# Patient Record
Sex: MALE | Race: BLACK OR AFRICAN AMERICAN | Employment: UNEMPLOYED | URBAN - METROPOLITAN AREA
[De-identification: names, ages, dates, MRNs, and addresses within clinical notes are randomized per-mention and may not be internally consistent; named-entity substitution may affect disease eponyms.]

---

## 2018-08-09 ENCOUNTER — TELEPHONE (OUTPATIENT)
Dept: ENT CLINIC | Age: 43
End: 2018-08-09

## 2019-02-19 ENCOUNTER — TELEPHONE (OUTPATIENT)
Dept: NON INVASIVE DIAGNOSTICS | Age: 44
End: 2019-02-19

## 2019-04-05 ENCOUNTER — HOSPITAL ENCOUNTER (EMERGENCY)
Age: 44
Discharge: HOME | End: 2019-04-05
Payer: COMMERCIAL

## 2019-04-05 VITALS
WEIGHT: 298 LBS | RESPIRATION RATE: 16 BRPM | HEART RATE: 82 BPM | BODY MASS INDEX: 36.29 KG/M2 | SYSTOLIC BLOOD PRESSURE: 111 MMHG | OXYGEN SATURATION: 97 % | DIASTOLIC BLOOD PRESSURE: 75 MMHG | TEMPERATURE: 98.4 F | HEIGHT: 76 IN

## 2019-04-05 DIAGNOSIS — B35.3 TINEA PEDIS OF LEFT FOOT: Primary | ICD-10-CM

## 2019-04-05 PROCEDURE — 99282 EMERGENCY DEPT VISIT SF MDM: CPT

## 2019-04-05 RX ORDER — LOSARTAN POTASSIUM 25 MG/1
50 TABLET ORAL DAILY
Status: ON HOLD | COMMUNITY
End: 2021-11-17 | Stop reason: HOSPADM

## 2019-04-05 NOTE — ED PROVIDER NOTES
[facility]      HPI:  4/5/19,   Time: 2:06 AM         Lige Closs is a 38 y.o. male presenting to the ED for waking him out of his sleep just prior to arrival was itching and between his left 4th and 5th toes he has never had before. He has no pain over his feet. Tried nothing to improve his condition. He is not diabetic. ROS:   Pertinent positives and negatives are stated within HPI, all other systems reviewed and are negative.  --------------------------------------------- PAST HISTORY ---------------------------------------------  Past Medical History:  has a past medical history of Hyperlipidemia, Hypertension, and Stroke ([facility] [address] Past Surgical History:  has no past surgical history on file. Social History:  reports that he has never smoked. He has never used smokeless tobacco. He reports that he drinks alcohol. He reports that he does not use drugs. Family History: family history includes Cancer in his father. The patients home medications have been reviewed. Allergies: Patient has no known allergies. -------------------------------------------------- RESULTS -------------------------------------------------  All laboratory and radiology results have been personally reviewed by myself   LABS:  No results found for this visit on 04/05/19. RADIOLOGY:  Interpreted by Radiologist.  No orders to display       ------------------------- NURSING NOTES AND VITALS REVIEWED ---------------------------   The nursing notes within the ED encounter and vital signs as below have been reviewed.    /75   Pulse 82   Temp 98.4 °F (36.9 °C) (Oral)   Resp 16   Ht 6' 4\" (1.93 m)   Wt 298 lb (135.2 kg)   SpO2 97%   BMI 36.27 kg/m²   Oxygen Saturation Interpretation: Normal      ---------------------------------------------------PHYSICAL EXAM--------------------------------------      Constitutional/General: Alert and oriented x3, well appearing, non toxic in NAD  Head: NC/AT  Eye: Bilateral sclera clear Neck: Supple  Pulmonary: Lungs clear to auscultation bilaterally, no wheezes, rales, or rhonchi. Not in respiratory distress  Cardiovascular:  Regular rate and rhythm, no murmurs, gallops, or rubs. 2+ distal pulses  Extremities: Moves all extremities x 4. Warm and well perfused. No lesions sugar affected area. 4th and 5th toes of the left foot on there is slight increase in erythema compared to the left. Otherwise left toes foot ankle: with FROM active and passive w/o crepitus, pulses +2, reflexes +2, temperature, strength, color, cap refill <2 secs, normal and all equal to the right  Skin: warm and dry without rash  Neurologic: GCS 15, Face is symmetric (VII), EOMs are intact (III, IV, VI), pupils are equal and reactive (II, III), tongue is midline (XII). The patient is walking in a smooth balanced and coordinated fashion w/o bumping or walking into anything (vision), talking w/ appropriate content and fluency, is fully attentive, and provided a spontaneous coherent history. Psych: Normal Affect      ------------------------------------------PROGRESS NOTES -------------------------------------------    MDM  Treating for tinea pedis. PCP and podiatry follow-up    ED COURSE MEDICATIONS:              Medications - No data to display        COUNSELING:   I have spoken with the patient and discussed todays results, in addition to providing specific details for the plan of care and counseling regarding the diagnosis and prognosis.     --------------------------------------- IMPRESSION & DISPOSITION --------------------------------     IMPRESSION(s):  1. Tinea pedis of left foot          DISPOSITION:  Disposition: Discharge to home. Patient condition is good.                  Fam Kramer, EVON - CNP  04/05/19 1810

## 2019-04-05 NOTE — ED NOTES
Reviewed discharge instructions with patient, discussed medications and addressed all patient questions/concerns. Pt verbalizes understanding.        Shilpa Kern RN  04/05/19 2145

## 2020-02-06 ENCOUNTER — APPOINTMENT (OUTPATIENT)
Dept: CT IMAGING | Age: 45
End: 2020-02-06
Payer: COMMERCIAL

## 2020-02-06 ENCOUNTER — APPOINTMENT (OUTPATIENT)
Dept: GENERAL RADIOLOGY | Age: 45
End: 2020-02-06
Payer: COMMERCIAL

## 2020-02-06 ENCOUNTER — HOSPITAL ENCOUNTER (EMERGENCY)
Age: 45
Discharge: HOME | End: 2020-02-07
Attending: EMERGENCY MEDICINE
Payer: COMMERCIAL

## 2020-02-06 LAB
ALBUMIN SERPL-MCNC: 4.4 G/DL (ref 3.5–5.2)
ALP BLD-CCNC: 56 U/L (ref 40–129)
ALT SERPL-CCNC: 25 U/L (ref 0–40)
ANION GAP SERPL CALCULATED.3IONS-SCNC: 12 MMOL/L (ref 7–16)
AST SERPL-CCNC: 26 U/L (ref 0–39)
BASOPHILS ABSOLUTE: 0.03 E9/L (ref 0–0.2)
BASOPHILS RELATIVE PERCENT: 0.5 % (ref 0–2)
BILIRUB SERPL-MCNC: 0.3 MG/DL (ref 0–1.2)
BUN BLDV-MCNC: 15 MG/DL (ref 6–20)
CALCIUM SERPL-MCNC: 9.1 MG/DL (ref 8.6–10.2)
CHLORIDE BLD-SCNC: 101 MMOL/L (ref 98–107)
CO2: 27 MMOL/L (ref 22–29)
CREAT SERPL-MCNC: 1.4 MG/DL (ref 0.7–1.2)
EOSINOPHILS ABSOLUTE: 0.12 E9/L (ref 0.05–0.5)
EOSINOPHILS RELATIVE PERCENT: 1.9 % (ref 0–6)
GFR AFRICAN AMERICAN: >60
GFR NON-AFRICAN AMERICAN: >60 ML/MIN/1.73
GLUCOSE BLD-MCNC: 107 MG/DL (ref 74–99)
HCT VFR BLD CALC: 47.1 % (ref 37–54)
HEMOGLOBIN: 16.4 G/DL (ref 12.5–16.5)
IMMATURE GRANULOCYTES #: 0.01 E9/L
IMMATURE GRANULOCYTES %: 0.2 % (ref 0–5)
LIPASE: 91 U/L (ref 13–60)
LYMPHOCYTES ABSOLUTE: 3.05 E9/L (ref 1.5–4)
LYMPHOCYTES RELATIVE PERCENT: 49.2 % (ref 20–42)
MCH RBC QN AUTO: 30 PG (ref 26–35)
MCHC RBC AUTO-ENTMCNC: 34.8 % (ref 32–34.5)
MCV RBC AUTO: 86.1 FL (ref 80–99.9)
MONOCYTES ABSOLUTE: 0.57 E9/L (ref 0.1–0.95)
MONOCYTES RELATIVE PERCENT: 9.2 % (ref 2–12)
NEUTROPHILS ABSOLUTE: 2.42 E9/L (ref 1.8–7.3)
NEUTROPHILS RELATIVE PERCENT: 39 % (ref 43–80)
PDW BLD-RTO: 12.8 FL (ref 11.5–15)
PLATELET # BLD: 252 E9/L (ref 130–450)
PMV BLD AUTO: 9.1 FL (ref 7–12)
POTASSIUM REFLEX MAGNESIUM: 4.1 MMOL/L (ref 3.5–5)
RBC # BLD: 5.47 E12/L (ref 3.8–5.8)
SODIUM BLD-SCNC: 140 MMOL/L (ref 132–146)
TOTAL PROTEIN: 7 G/DL (ref 6.4–8.3)
TROPONIN: <0.01 NG/ML (ref 0–0.03)
WBC # BLD: 6.2 E9/L (ref 4.5–11.5)

## 2020-02-06 PROCEDURE — 83690 ASSAY OF LIPASE: CPT

## 2020-02-06 PROCEDURE — 96375 TX/PRO/DX INJ NEW DRUG ADDON: CPT

## 2020-02-06 PROCEDURE — 80053 COMPREHEN METABOLIC PANEL: CPT

## 2020-02-06 PROCEDURE — 71045 X-RAY EXAM CHEST 1 VIEW: CPT

## 2020-02-06 PROCEDURE — 36415 COLL VENOUS BLD VENIPUNCTURE: CPT

## 2020-02-06 PROCEDURE — 6360000002 HC RX W HCPCS: Performed by: EMERGENCY MEDICINE

## 2020-02-06 PROCEDURE — 93005 ELECTROCARDIOGRAM TRACING: CPT | Performed by: EMERGENCY MEDICINE

## 2020-02-06 PROCEDURE — 96374 THER/PROPH/DIAG INJ IV PUSH: CPT

## 2020-02-06 PROCEDURE — 99284 EMERGENCY DEPT VISIT MOD MDM: CPT

## 2020-02-06 PROCEDURE — 85025 COMPLETE CBC W/AUTO DIFF WBC: CPT

## 2020-02-06 PROCEDURE — 84484 ASSAY OF TROPONIN QUANT: CPT

## 2020-02-06 PROCEDURE — 70450 CT HEAD/BRAIN W/O DYE: CPT

## 2020-02-06 RX ORDER — METOCLOPRAMIDE HYDROCHLORIDE 5 MG/ML
10 INJECTION INTRAMUSCULAR; INTRAVENOUS ONCE
Status: COMPLETED | OUTPATIENT
Start: 2020-02-06 | End: 2020-02-06

## 2020-02-06 RX ORDER — DIPHENHYDRAMINE HYDROCHLORIDE 50 MG/ML
12.5 INJECTION INTRAMUSCULAR; INTRAVENOUS ONCE
Status: COMPLETED | OUTPATIENT
Start: 2020-02-06 | End: 2020-02-06

## 2020-02-06 RX ADMIN — DIPHENHYDRAMINE HYDROCHLORIDE 12.5 MG: 50 INJECTION, SOLUTION INTRAMUSCULAR; INTRAVENOUS at 22:59

## 2020-02-06 RX ADMIN — METOCLOPRAMIDE 10 MG: 5 INJECTION, SOLUTION INTRAMUSCULAR; INTRAVENOUS at 22:59

## 2020-02-06 ASSESSMENT — VISUAL ACUITY
OU: 20/15
OD: 20/30
OS: 20/30

## 2020-02-06 ASSESSMENT — PAIN SCALES - GENERAL: PAINLEVEL_OUTOF10: 10

## 2020-02-07 VITALS
BODY MASS INDEX: 35.92 KG/M2 | DIASTOLIC BLOOD PRESSURE: 82 MMHG | HEIGHT: 76 IN | HEART RATE: 82 BPM | SYSTOLIC BLOOD PRESSURE: 149 MMHG | RESPIRATION RATE: 14 BRPM | WEIGHT: 295 LBS | TEMPERATURE: 97.7 F | OXYGEN SATURATION: 97 %

## 2020-02-07 NOTE — ED NOTES
Bed: 12  Expected date:   Expected time:   Means of arrival:   Comments:  Triage 43 Alina Tierney RN  02/06/20 [id number]

## 2020-02-07 NOTE — ED PROVIDER NOTES
Department of Emergency Medicine   ED  Provider Note  Admit Date/RoomTime: 2/6/2020  9:11 PM  ED Room: NIKI/NIKI      History of Present Illness:  2/6/20, Time: 9:35 PM         Susan Spivey is a 54 y.o. male presenting to the ED for headache and numbness, beginning 7:30 this evening. The complaint has been persistent, moderate in severity, and worsened by nothing. Pt reports that he felt a HA developing around 1 PM this afternoon. His HA slowly progressed throughout the day and was full blown by 7:30. Around 8:15, pt began to have two episodes left arm and hand numbness lasting 15 seconds each. Pt also having dizziness and blurred vision. He notes that his arm numbness has since resolved. Hx of TIA in 2015. Pt denies LOC, SOB, CP, back pain, neck pain, n/v/d, fever, chills, dizziness, coughing, abdominal pain, leg numbness or any other general complaints. Review of Systems:   Pertinent positives and negatives are stated within HPI, all other systems reviewed and are negative.    --------------------------------------------- PAST HISTORY ---------------------------------------------  Past Medical History:  has a past medical history of Hyperlipidemia, Hypertension, and Stroke ([facility] [address] Past Surgical History:  has no past surgical history on file. Social History:  reports that he has never smoked. He has never used smokeless tobacco. He reports current alcohol use. He reports that he does not use drugs. Family History: family history includes Cancer in his father. The patients home medications have been reviewed. Allergies: Patient has no known allergies.     ---------------------------------------------------PHYSICAL EXAM--------------------------------------    Constitutional/General: Alert and oriented x3, well appearing, non toxic in NAD  Head: Normocephalic and atraumatic  Eyes: PERRL, EOMI, conjunctiva normal, sclera non icteric  Respiratory: Lungs clear to auscultation bilaterally, no wheezes, rales, or rhonchi. Not in respiratory distress  Cardiovascular:  Regular rate. Regular rhythm. No murmurs, gallops, or rubs. 2+ distal pulses  Chest: No chest wall tenderness  GI:  Abdomen Soft, Non tender, Non distended. Musculoskeletal: Moves all extremities x 4. Warm and well perfused, no clubbing, cyanosis, or edema. Integument: Skin warm and dry. Physiatric: Normal Affect  Neurologic: GCS 15, no focal deficits, symmetric strength 5/5 in the upper and lower extremities bilaterally, NIH:0, see scale  Last known well time: 7:30 PM  NIH Stroke Scale at time of initial evaluation: 9:30  1A: Level of Consciousness 0 - alert; keenly responsive   1B: Ask Month and Age 0 - answers both questions correctly   1C: Tell Patient To Open and Close Eyes, then Hand  Squeeze 0 - performs both tasks correctly   2: Test Horizontal Extraocular Movements 0 - normal   3: Test Visual Fields 0 - no visual loss   4: Test Facial Palsy 0 - normal symmetric movement   5A: Test Left Arm Motor Drift 0 - no drift, limb holds 90 (or 45) degrees for full 10 seconds   5B: Test Right Arm Motor Drift 0 - no drift, limb holds 90 (or 45) degrees for full 10 seconds   6A: Test Left Leg Motor Drift 0 - no drift; leg holds 30 degree position for full 5 seconds   6B: Test Right Leg Motor Drift 0 - no drift; leg holds 30 degree position for full 5 seconds   7: Test Limb Ataxia   (FNF/Heel-Shin) 0 - absent   8: Test Sensation 0 - normal; no sensory loss   9: Test Language/Aphasia 0 - no aphasia, normal   10: Test Dysarthria 0 - normal   11: Test Extinction/Inattention 0 - no abnormality   Total NIH Stroke Score: 0     -------------------------------------------------- RESULTS -------------------------------------------------  I have personally reviewed all laboratory and imaging results for this patient. Results are listed below.      LABS:  Results for orders placed or performed during the hospital encounter of 02/06/20   CBC Auto

## 2020-02-08 LAB
EKG ATRIAL RATE: 66 BPM
EKG P AXIS: 20 DEGREES
EKG P-R INTERVAL: 152 MS
EKG Q-T INTERVAL: 382 MS
EKG QRS DURATION: 92 MS
EKG QTC CALCULATION (BAZETT): 400 MS
EKG R AXIS: 1 DEGREES
EKG T AXIS: 4 DEGREES
EKG VENTRICULAR RATE: 66 BPM

## 2020-02-08 PROCEDURE — 93010 ELECTROCARDIOGRAM REPORT: CPT | Performed by: INTERNAL MEDICINE

## 2021-05-10 ENCOUNTER — TELEPHONE (OUTPATIENT)
Dept: NON INVASIVE DIAGNOSTICS | Age: 46
End: 2021-05-10

## 2021-05-12 NOTE — TELEPHONE ENCOUNTER
Lm to call the office back to schedule a new pt appointment. Records scanned   Seen Dr Fraga Pleasure 10/28/2016.

## 2021-05-18 NOTE — TELEPHONE ENCOUNTER
Pt returning call to schedule NP apt with EP. Scheduled with Dr. Magdi Kline for: 5/25/21 - Former [facility] pt Loop pt - all recs in chart. Pt wants Loop removed.

## 2021-05-27 NOTE — TELEPHONE ENCOUNTER
Patient scheduled 05/25/2021 @ 8:30 AM with Dr Jessenia Reddy. He no showed to the appointment. Called the patient and he said that he forgot all about the appointment. Asked to R/S the appt and the patient said that he will call back to schedule appointment when ready.

## 2021-07-13 PROCEDURE — 99283 EMERGENCY DEPT VISIT LOW MDM: CPT

## 2021-07-13 ASSESSMENT — PAIN SCALES - GENERAL: PAINLEVEL_OUTOF10: 7

## 2021-07-14 ENCOUNTER — HOSPITAL ENCOUNTER (EMERGENCY)
Age: 46
Discharge: HOME | End: 2021-07-14
Payer: COMMERCIAL

## 2021-07-14 ENCOUNTER — APPOINTMENT (OUTPATIENT)
Dept: CT IMAGING | Age: 46
End: 2021-07-14
Payer: COMMERCIAL

## 2021-07-14 VITALS
DIASTOLIC BLOOD PRESSURE: 85 MMHG | BODY MASS INDEX: 35.07 KG/M2 | HEART RATE: 75 BPM | WEIGHT: 288 LBS | OXYGEN SATURATION: 97 % | TEMPERATURE: 98 F | SYSTOLIC BLOOD PRESSURE: 130 MMHG | HEIGHT: 76 IN | RESPIRATION RATE: 16 BRPM

## 2021-07-14 DIAGNOSIS — H53.9 VISUAL DISTURBANCE: Primary | ICD-10-CM

## 2021-07-14 LAB
ALBUMIN SERPL-MCNC: 4 G/DL (ref 3.5–5.2)
ALP BLD-CCNC: 59 U/L (ref 40–129)
ALT SERPL-CCNC: 21 U/L (ref 0–40)
ANION GAP SERPL CALCULATED.3IONS-SCNC: 6 MMOL/L (ref 7–16)
AST SERPL-CCNC: 26 U/L (ref 0–39)
BASOPHILS ABSOLUTE: 0.03 E9/L (ref 0–0.2)
BASOPHILS RELATIVE PERCENT: 0.4 % (ref 0–2)
BILIRUB SERPL-MCNC: 0.4 MG/DL (ref 0–1.2)
BUN BLDV-MCNC: 21 MG/DL (ref 6–20)
CALCIUM SERPL-MCNC: 9.4 MG/DL (ref 8.6–10.2)
CHLORIDE BLD-SCNC: 99 MMOL/L (ref 98–107)
CO2: 30 MMOL/L (ref 22–29)
CREAT SERPL-MCNC: 1.3 MG/DL (ref 0.7–1.2)
EOSINOPHILS ABSOLUTE: 0.19 E9/L (ref 0.05–0.5)
EOSINOPHILS RELATIVE PERCENT: 2.6 % (ref 0–6)
GFR AFRICAN AMERICAN: >60
GFR NON-AFRICAN AMERICAN: >60 ML/MIN/1.73
GLUCOSE BLD-MCNC: 114 MG/DL (ref 74–99)
HCT VFR BLD CALC: 46.5 % (ref 37–54)
HEMOGLOBIN: 15.7 G/DL (ref 12.5–16.5)
IMMATURE GRANULOCYTES #: 0.01 E9/L
IMMATURE GRANULOCYTES %: 0.1 % (ref 0–5)
LYMPHOCYTES ABSOLUTE: 3.05 E9/L (ref 1.5–4)
LYMPHOCYTES RELATIVE PERCENT: 42.1 % (ref 20–42)
MCH RBC QN AUTO: 28.8 PG (ref 26–35)
MCHC RBC AUTO-ENTMCNC: 33.8 % (ref 32–34.5)
MCV RBC AUTO: 85.2 FL (ref 80–99.9)
MONOCYTES ABSOLUTE: 0.78 E9/L (ref 0.1–0.95)
MONOCYTES RELATIVE PERCENT: 10.8 % (ref 2–12)
NEUTROPHILS ABSOLUTE: 3.19 E9/L (ref 1.8–7.3)
NEUTROPHILS RELATIVE PERCENT: 44 % (ref 43–80)
PDW BLD-RTO: 12.8 FL (ref 11.5–15)
PLATELET # BLD: 256 E9/L (ref 130–450)
PMV BLD AUTO: 8.8 FL (ref 7–12)
POTASSIUM SERPL-SCNC: 4.5 MMOL/L (ref 3.5–5)
RBC # BLD: 5.46 E12/L (ref 3.8–5.8)
SODIUM BLD-SCNC: 135 MMOL/L (ref 132–146)
TOTAL PROTEIN: 6.9 G/DL (ref 6.4–8.3)
WBC # BLD: 7.3 E9/L (ref 4.5–11.5)

## 2021-07-14 PROCEDURE — 80053 COMPREHEN METABOLIC PANEL: CPT

## 2021-07-14 PROCEDURE — 70450 CT HEAD/BRAIN W/O DYE: CPT

## 2021-07-14 PROCEDURE — 85025 COMPLETE CBC W/AUTO DIFF WBC: CPT

## 2021-07-14 NOTE — ED NOTES
FIRST PROVIDER CONTACT ASSESSMENT NOTE      Department of Emergency Medicine   7/13/21  10:54 PM EDT    Chief Complaint: Diplopia (Right Eye x2 Months; Intermittent; Scheduled for MRI) and Headache      History of Present Illness:   Aracely Caro is a 51 y.o. male who presents to the ED for    Medical History:  has a past medical history of Hyperlipidemia, Hypertension, and Stroke ([facility] [address] Surgical History:  has no past surgical history on file. Social History:  reports that he has never smoked. He has never used smokeless tobacco. He reports current alcohol use. He reports that he does not use drugs. Family History: family history includes Cancer in his father. *ALLERGIES*     Patient has no known allergies. Physical Exam:      VS:  There were no vitals taken for this visit.      Initial Plan of Care:  Initiate Treatment-Testing, Proceed toTreatment Area When Bed Available for ED Attending/MLP to Continue Care    -----------------END OF FIRST PROVIDER CONTACT ASSESSMENT NOTE--------------  Electronically signed by EVON Reyes CNP   DD: 7/13/21             EVON Faith CNP  07/13/21 [id number]

## 2021-07-14 NOTE — ED PROVIDER NOTES
HPI:  7/14/21, Time: 1:45 AM EDT         Asif Barba is a 51 y.o. male presenting to the ED for Intermittent diplopia, beginning 2 months ago. The complaint has been intermittent, moderate in severity, and worsened by nothing. Patient does have a history of a stroke 2 years ago. He states is on the posterior left aspect of his head causing headaches at the time. Has not had the symptoms until approximately 2 months ago. States these are intermittent and occur from time to time usually occur in the afternoon. . Patient denies fever/chills, sore throat, cough, congestion, chest pain, shortness of breath, edema, headache, visual disturbances, focal paresthesias, focal weakness, abdominal pain, nausea, vomiting, diarrhea, constipation, dysuria, hematuria, trauma, neck or back pain, rash or other complaints. ROS:   A complete review of systems was performed and all pertinent positives and negatives are stated within HPI, all other systems reviewed and are negative.      --------------------------------------------- PAST HISTORY ---------------------------------------------  Past Medical History:  has a past medical history of Hyperlipidemia, Hypertension, and Stroke ([facility] [address] Past Surgical History:  has no past surgical history on file. Social History:  reports that he has never smoked. He has never used smokeless tobacco. He reports current alcohol use. He reports that he does not use drugs. Family History: family history includes Cancer in his father. The patients home medications have been reviewed. Allergies: Patient has no known allergies. ----------------------------------------PHYSICAL EXAM--------------------------------------  Constitutional:  Well developed, well nourished, no acute distress, non-toxic appearance   Eyes:  PERRL, conjunctiva normal, EOMI  HENT:  Atraumatic, external ears normal, nose normal, oropharynx moist, no pharyngeal exudates.  Neck- normal range of motion, no nuchal rigidity   Respiratory:  No respiratory distress, normal breath sounds, no rales, no wheezing   Cardiovascular:  Normal rate, normal rhythm, no murmurs, no gallops, no rubs. Radial and DP pulses 2+ bilaterally. GI:  Soft, nondistended, normal bowel sounds, nontender, no organomegaly, no mass, no rebound, no guarding   :  No costovertebral angle tenderness   Musculoskeletal:  No edema, no tenderness, no deformities. Back- no tenderness  Integument:  Well hydrated, no rash. Adequate perfusion. Lymphatic:  No cervical lymphadenopathy noted   Neurologic:  Alert & oriented x 3, CN 2-12 normal, normal motor function, normal sensory function, no focal deficits noted. Normal gait. Psychiatric:  Speech and behavior appropriate       -------------------------------------------------- RESULTS -------------------------------------------------  I have personally reviewed all laboratory and imaging results for this patient. Results are listed below.      LABS:  Results for orders placed or performed during the hospital encounter of 07/14/21   CBC Auto Differential   Result Value Ref Range    WBC 7.3 4.5 - 11.5 E9/L    RBC 5.46 3.80 - 5.80 E12/L    Hemoglobin 15.7 12.5 - 16.5 g/dL    Hematocrit 46.5 37.0 - 54.0 %    MCV 85.2 80.0 - 99.9 fL    MCH 28.8 26.0 - 35.0 pg    MCHC 33.8 32.0 - 34.5 %    RDW 12.8 11.5 - 15.0 fL    Platelets 600 980 - 862 E9/L    MPV 8.8 7.0 - 12.0 fL    Neutrophils % 44.0 43.0 - 80.0 %    Immature Granulocytes % 0.1 0.0 - 5.0 %    Lymphocytes % 42.1 (H) 20.0 - 42.0 %    Monocytes % 10.8 2.0 - 12.0 %    Eosinophils % 2.6 0.0 - 6.0 %    Basophils % 0.4 0.0 - 2.0 %    Neutrophils Absolute 3.19 1.80 - 7.30 E9/L    Immature Granulocytes # 0.01 E9/L    Lymphocytes Absolute 3.05 1.50 - 4.00 E9/L    Monocytes Absolute 0.78 0.10 - 0.95 E9/L    Eosinophils Absolute 0.19 0.05 - 0.50 E9/L    Basophils Absolute 0.03 0.00 - 0.20 E9/L   Comprehensive Metabolic Panel   Result Value Ref Range Sodium 135 132 - 146 mmol/L    Potassium 4.5 3.5 - 5.0 mmol/L    Chloride 99 98 - 107 mmol/L    CO2 30 (H) 22 - 29 mmol/L    Anion Gap 6 (L) 7 - 16 mmol/L    Glucose 114 (H) 74 - 99 mg/dL    BUN 21 (H) 6 - 20 mg/dL    CREATININE 1.3 (H) 0.7 - 1.2 mg/dL    GFR Non-African American >60 >=60 mL/min/1.73    GFR African American >60     Calcium 9.4 8.6 - 10.2 mg/dL    Total Protein 6.9 6.4 - 8.3 g/dL    Albumin 4.0 3.5 - 5.2 g/dL    Total Bilirubin 0.4 0.0 - 1.2 mg/dL    Alkaline Phosphatase 59 40 - 129 U/L    ALT 21 0 - 40 U/L    AST 26 0 - 39 U/L       RADIOLOGY:  Interpreted by Radiologist.  CT HEAD WO CONTRAST   Final Result   No acute intracranial abnormality. Heterogeneously hypodense area in the left posterior parietal lobe as above. Although this likely represent an area of old infarct or remote hemorrhage,   the possibility of a very slow growing neoplasm cannot be entirely excluded. MRI with and without IV contrast may be obtained if clinically indicated. ------------------------- NURSING NOTES AND VITALS REVIEWED ---------------------------  The nursing notes within the ED encounter and vital signs as below have been reviewed by myself. /85   Pulse 75   Temp 98 °F (36.7 °C) (Oral)   Resp 16   Ht 6' 4\" (1.93 m)   Wt 288 lb (130.6 kg)   SpO2 97%   BMI 35.06 kg/m²   Oxygen Saturation Interpretation: Normal      The patients available past medical records and past encounters were reviewed. ------------------------------ ED COURSE/MEDICAL DECISION MAKING----------------------  Medications - No data to display           Medical Decision Making:    Patient 41-year-old male I discussed with him found that he had a stroke 2 years ago in the posterior left aspect of his brain. Patient had the new onset diplopia regarding the right vision field that could be related to this previous stroke.   CT imaging did reveal a previous infarction region did not reveal tonicity of the lesion. Patient after receiving CT imaging and laboratory evaluation that was reassuring was told that he needs to follow back up with his neurologist [facility] for further evaluation of his subacute short-term diplopia that is been ongoing for a few months. He had no diplopia in the emergency department no symptoms whatsoever was stable and will likely need for neurologic evaluation on outpatient basis to continue to determine the cause of this intermittent diplopia. Patient was explicitly instructed on specific signs and symptoms on which to return to the emergency room for. Patient was instructed to return to the ER for any new or worsening symptoms. Additional discharge instructions were given verbally. All questions were answered. Patient is comfortable and agreeable with discharge plan. Patient in no acute distress and non-toxic in appearance. This patient's ED course included: a personal history and physicial examination, re-evaluation prior to disposition, multiple bedside re-evaluations, IV medications, cardiac monitoring, continuous pulse oximetry, complex medical decision making and emergency management and a personal history and physicial eaxmination    This patient has remained hemodynamically stable during their ED course. Re-Evaluations:  Time: 2340   Re-evaluation. Patients symptoms show no change  Repeat physical examination is not changed        Counseling: The emergency provider has spoken with the patient and discussed todays results, in addition to providing specific details for the plan of care and counseling regarding the diagnosis and prognosis. Questions are answered at this time and they are agreeable with the plan.    --------------------------- IMPRESSION AND DISPOSITION ---------------------------------    IMPRESSION  1.  Visual disturbance        DISPOSITION  Disposition: Discharge to home  Patient condition is stable              Becky Kapoor DO  Resident  07/16/21 1503

## 2021-09-02 ENCOUNTER — HOSPITAL ENCOUNTER (EMERGENCY)
Age: 46
Discharge: HOME | End: 2021-09-02
Payer: COMMERCIAL

## 2021-09-02 VITALS
BODY MASS INDEX: 35.93 KG/M2 | TEMPERATURE: 97.5 F | SYSTOLIC BLOOD PRESSURE: 129 MMHG | HEART RATE: 94 BPM | WEIGHT: 289 LBS | HEIGHT: 75 IN | DIASTOLIC BLOOD PRESSURE: 79 MMHG | RESPIRATION RATE: 18 BRPM | OXYGEN SATURATION: 95 %

## 2021-09-02 DIAGNOSIS — B02.9 HERPES ZOSTER WITHOUT COMPLICATION: Primary | ICD-10-CM

## 2021-09-02 PROCEDURE — 6370000000 HC RX 637 (ALT 250 FOR IP): Performed by: NURSE PRACTITIONER

## 2021-09-02 PROCEDURE — 99282 EMERGENCY DEPT VISIT SF MDM: CPT

## 2021-09-02 RX ORDER — OXYCODONE AND ACETAMINOPHEN 10; 325 MG/1; MG/1
1 TABLET ORAL EVERY 6 HOURS PRN
Qty: 12 TABLET | Refills: 0 | Status: SHIPPED | OUTPATIENT
Start: 2021-09-02 | End: 2021-09-05

## 2021-09-02 RX ORDER — OXYCODONE HYDROCHLORIDE 5 MG/1
10 TABLET ORAL ONCE
Status: COMPLETED | OUTPATIENT
Start: 2021-09-02 | End: 2021-09-02

## 2021-09-02 RX ADMIN — OXYCODONE HYDROCHLORIDE 10 MG: 5 TABLET ORAL at 23:22

## 2021-09-02 ASSESSMENT — PAIN SCALES - GENERAL: PAINLEVEL_OUTOF10: 7

## 2021-09-03 NOTE — ED PROVIDER NOTES
[facility]  Department of Emergency Medicine   ED  Encounter Note  Admit Date/RoomTime: 2021 10:58 PM  ED Room: 36/36    NAME: John Maldonado  : 1975  MRN: [id number]     Chief Complaint:  Generalized Body Aches (seen at urgent care this am dx with shingles )    History of Present Illness       John Maldonado is a 51 y.o. old male who presents to the emergency department by private vehicle, for gradual onset of red, raised and painful area on coccyx which began 4 day(s) prior to arrival.  The symptoms were caused by unknown cause. He was evaluated at the urgent care and diagnosed with shingles. He was prescribed valacyclovir and has just started taking this medication without improvement. Since onset the symptoms have been persistent. Prior history of similar episodes: No.   His symptoms are associated with nothing additional and relieved by nothing. He denies any headache, neck pain, fever, chills, sore throat, chest pain, shortness of breath, cough, dental pain, nausea, vomiting, diarrhea, constipation, or dysuria. ROS   Pertinent positives and negatives are stated within HPI, all other systems reviewed and are negative. Past Medical History:  has a past medical history of Hyperlipidemia, Hypertension, and Stroke ([facility] [address] Surgical History:  has no past surgical history on file. Social History:  reports that he has never smoked. He has never used smokeless tobacco. He reports current alcohol use. He reports that he does not use drugs. Family History: family history includes Cancer in his father. Allergies: Patient has no known allergies. Physical Exam   Oxygen Saturation Interpretation: Normal.        ED Triage Vitals [21 2224]   BP Temp Temp Source Pulse Resp SpO2 Height Weight   129/79 97.5 °F (36.4 °C) Tympanic 94 18 95 % 6' 3\" (1.905 m) 289 lb (131.1 kg)         Constitutional:  Alert, development consistent with age.   HEENT: NC/NT. Airway patent. Eyes:  PERRL, EOMI, no discharge. Ears:  TMs without perforation, injection, or bulging. External canals clear without exudate. Mouth:  Mucous membranes moist without lesions, tongue and gums normal.  Throat:  Pharynx without injection, exudate, or tonsillar hypertrophy. Airway patient. Neck:  Supple. No lymphadenopathy. Respiratory:  Clear to auscultation and breath sounds equal.  CV:  Regular rate and rhythm. GI:  Abdomen Soft, nontender, +BS. Integument:  Skin turgor: Normal.              Mildly erythemic tender vesicular rash on his coccyx bilateral of the midline. No surrounding erythema, edema, or drainage. Neurological:  Orientation age-appropriate unless noted elseware. Motor functions intact. Lab / Imaging Results   (All laboratory and radiology results have been personally reviewed by myself)  Labs:  No results found for this visit on 09/02/21. Imaging: All Radiology results interpreted by Radiologist unless otherwise noted. No orders to display       ED Course / Medical Decision Making     Medications   oxyCODONE (ROXICODONE) immediate release tablet 10 mg (has no administration in time range)        Consults:   None    Procedures:   none    MDM:   Patient has the clinical presentation of herpes Boster on his coccyx region. He appears well and nontoxic. He has no systemic symptoms. He is already prescribed valacyclovir by the urgent care. He was medicated symptomatically with oxycodone in the emergency department and will be given a prescription for home. His symptoms have been going on for approximately 4 days and I do not think that oral steroids would benefit him at this time. He is appropriate for discharge and outpatient follow-up. He is instructed to return to emergency department immediately with any new or worsening symptoms.     Plan of Care/Counseling:  EVON Ocasio CNP reviewed today's visit with the patient and spouse / life partner in addition to providing specific details for the plan of care and counseling regarding the diagnosis and prognosis. Questions are answered at this time and are agreeable with the plan. Assessment      1. Herpes zoster without complication      Plan   Discharged home. Patient condition is good    New Medications     New Prescriptions    OXYCODONE-ACETAMINOPHEN (PERCOCET)  MG PER TABLET    Take 1 tablet by mouth every 6 hours as needed for Pain for up to 3 days. Intended supply: 30 days     Electronically signed by EVON Mathis CNP   DD: 9/2/21  **This report was transcribed using voice recognition software. Every effort was made to ensure accuracy; however, inadvertent computerized transcription errors may be present.   END OF ED PROVIDER NOTE       EVON Orourke CNP  09/02/21 [id number]

## 2021-09-08 ENCOUNTER — HOSPITAL ENCOUNTER (OUTPATIENT)
Dept: ULTRASOUND IMAGING | Age: 46
Discharge: HOME | End: 2021-09-10
Payer: COMMERCIAL

## 2021-09-08 ENCOUNTER — HOSPITAL ENCOUNTER (EMERGENCY)
Age: 46
Discharge: HOME | End: 2021-09-09
Attending: EMERGENCY MEDICINE
Payer: COMMERCIAL

## 2021-09-08 DIAGNOSIS — R22.43 LOCALIZED SWELLING, MASS AND LUMP, LOWER LIMB, BILATERAL: ICD-10-CM

## 2021-09-08 DIAGNOSIS — I82.4Y9 ACUTE DEEP VEIN THROMBOSIS (DVT) OF PROXIMAL VEIN OF LOWER EXTREMITY, UNSPECIFIED LATERALITY (HCC): Primary | ICD-10-CM

## 2021-09-08 LAB
ALBUMIN SERPL-MCNC: 3.8 G/DL (ref 3.5–5.2)
ALP BLD-CCNC: 74 U/L (ref 40–129)
ALT SERPL-CCNC: 44 U/L (ref 0–40)
ANION GAP SERPL CALCULATED.3IONS-SCNC: 7 MMOL/L (ref 7–16)
APTT: 26.6 SEC (ref 24.5–35.1)
AST SERPL-CCNC: 24 U/L (ref 0–39)
BILIRUB SERPL-MCNC: 0.5 MG/DL (ref 0–1.2)
BUN BLDV-MCNC: 11 MG/DL (ref 6–20)
CALCIUM SERPL-MCNC: 9.1 MG/DL (ref 8.6–10.2)
CHLORIDE BLD-SCNC: 98 MMOL/L (ref 98–107)
CO2: 30 MMOL/L (ref 22–29)
CREAT SERPL-MCNC: 1.3 MG/DL (ref 0.7–1.2)
GFR AFRICAN AMERICAN: >60
GFR NON-AFRICAN AMERICAN: >60 ML/MIN/1.73
GLUCOSE BLD-MCNC: 344 MG/DL (ref 74–99)
HCT VFR BLD CALC: 43.2 % (ref 37–54)
HEMOGLOBIN: 14.5 G/DL (ref 12.5–16.5)
INR BLD: 1.1
MCH RBC QN AUTO: 28.7 PG (ref 26–35)
MCHC RBC AUTO-ENTMCNC: 33.6 % (ref 32–34.5)
MCV RBC AUTO: 85.5 FL (ref 80–99.9)
PDW BLD-RTO: 13.2 FL (ref 11.5–15)
PLATELET # BLD: 151 E9/L (ref 130–450)
PMV BLD AUTO: 9.3 FL (ref 7–12)
POTASSIUM SERPL-SCNC: 4.5 MMOL/L (ref 3.5–5)
PROTHROMBIN TIME: 11.7 SEC (ref 9.3–12.4)
RBC # BLD: 5.05 E12/L (ref 3.8–5.8)
SODIUM BLD-SCNC: 135 MMOL/L (ref 132–146)
TOTAL PROTEIN: 7.3 G/DL (ref 6.4–8.3)
WBC # BLD: 7.1 E9/L (ref 4.5–11.5)

## 2021-09-08 PROCEDURE — 93971 EXTREMITY STUDY: CPT

## 2021-09-08 PROCEDURE — 85610 PROTHROMBIN TIME: CPT

## 2021-09-08 PROCEDURE — 80053 COMPREHEN METABOLIC PANEL: CPT

## 2021-09-08 PROCEDURE — 85730 THROMBOPLASTIN TIME PARTIAL: CPT

## 2021-09-08 PROCEDURE — 96372 THER/PROPH/DIAG INJ SC/IM: CPT

## 2021-09-08 PROCEDURE — 85027 COMPLETE CBC AUTOMATED: CPT

## 2021-09-08 PROCEDURE — 99283 EMERGENCY DEPT VISIT LOW MDM: CPT

## 2021-09-08 PROCEDURE — 93971 EXTREMITY STUDY: CPT | Performed by: RADIOLOGY

## 2021-09-08 RX ORDER — TOPIRAMATE 25 MG/1
25 TABLET ORAL 2 TIMES DAILY
COMMUNITY
End: 2021-11-05

## 2021-09-08 RX ORDER — LEVETIRACETAM 500 MG/1
1000 TABLET ORAL 2 TIMES DAILY
COMMUNITY
End: 2022-06-08

## 2021-09-08 RX ORDER — ROSUVASTATIN CALCIUM 5 MG/1
5 TABLET, COATED ORAL EVERY OTHER DAY
COMMUNITY

## 2021-09-08 NOTE — ED TRIAGE NOTES
FIRST PROVIDER CONTACT ASSESSMENT NOTE                                                                                                Department of Emergency Medicine                                                      First Provider Note  21  4:30 PM EDT  NAME: Farzaneh Warner  : 1975  MRN: [id number]    Chief Complaint: Leg Pain (had US of left calf today and was sent to ED by doctor)      History of Present Illness:   Farzaneh Warner is a 51 y.o. male who presents to the ED for sent by PCP for +dvt    Focused Physical Exam:  VS:    ED Triage Vitals   BP Temp Temp Source Pulse Resp SpO2 Height Weight   21 1451 21 1451 21 1451 21 1447 21 1451 21 1447 21 1451 21 1451   (!) 151/95 98.7 °F (37.1 °C) Oral 89 16 95 % 6' 2\" (1.88 m) 292 lb (132.5 kg)        General: Alert and in no apparent distress. Medical History:  has a past medical history of Hyperlipidemia, Hypertension, and Stroke ([facility] [place] [address] Surgical History:  has no past surgical history on file. Social History:  reports that he has never smoked. He has never used smokeless tobacco. He reports current alcohol use. He reports that he does not use drugs. Family History: family history includes Cancer in his father. Allergies: Patient has no known allergies.      Initial Plan of Care:  Initiate Treatment-Testing, Proceed toTreatment Area When Bed Available for ED Attending/MLP to Continue Care    -------------------------------------------------END OF FIRST PROVIDER CONTACT ASSESSMENT NOTE--------------------------------------------------------  Electronically signed by [id number], APRN - CNP   DD: 21

## 2021-09-09 ENCOUNTER — APPOINTMENT (OUTPATIENT)
Dept: CT IMAGING | Age: 46
End: 2021-09-09
Payer: COMMERCIAL

## 2021-09-09 VITALS
HEART RATE: 88 BPM | SYSTOLIC BLOOD PRESSURE: 148 MMHG | OXYGEN SATURATION: 96 % | RESPIRATION RATE: 17 BRPM | BODY MASS INDEX: 37.47 KG/M2 | DIASTOLIC BLOOD PRESSURE: 82 MMHG | WEIGHT: 292 LBS | HEIGHT: 74 IN | TEMPERATURE: 98 F

## 2021-09-09 PROCEDURE — 6360000002 HC RX W HCPCS: Performed by: EMERGENCY MEDICINE

## 2021-09-09 PROCEDURE — 70450 CT HEAD/BRAIN W/O DYE: CPT

## 2021-09-09 RX ADMIN — ENOXAPARIN SODIUM 135 MG: 150 INJECTION SUBCUTANEOUS at 00:48

## 2021-09-09 NOTE — ED NOTES
Bed: 19  Expected date:   Expected time:   Means of arrival:   Comments:  triage     Franc Shaw RN  09/08/21 2053
No

## 2021-09-09 NOTE — ED PROVIDER NOTES
HPI:  9/8/21, Time: 9:14 PM EDT         Gaby Watkins is a 51 y.o. male presenting to the ED for leg pain. Patient's been having leg pain for the past 4 days. Came on gradually, nothing makes it better or worse, aching sensation that does not radiate. Had an outpatient ultrasound which did show a DVT. Was recently mid to the [facility] on August 11 of this year, had a tumor resected. He is on no anticoagulation. He states he followed up yesterday in the office and was told everything was okay from the surgical standpoint. He denies any chest pain, shortness of breath, hemoptysis, cough, sputum, change in bowel or bladder, neck pain or stiffness, paresthesias, hematemesis, hematochezia, or any other symptoms or complaints. Review of Systems:   A complete review of systems was performed and pertinent positives and negatives are stated within HPI, all other systems reviewed and are negative.          --------------------------------------------- PAST HISTORY ---------------------------------------------  Past Medical History:  has a past medical history of Hyperlipidemia, Hypertension, and Stroke ([facility] [address] Past Surgical History:  has no past surgical history on file. Social History:  reports that he has never smoked. He has never used smokeless tobacco. He reports current alcohol use. He reports that he does not use drugs. Family History: family history includes Cancer in his father. The patients home medications have been reviewed. Allergies: Patient has no known allergies.     -------------------------------------------------- RESULTS -------------------------------------------------  All laboratory and radiology results have been personally reviewed by bobbyelf   LABS:  Results for orders placed or performed during the hospital encounter of 09/08/21   CBC   Result Value Ref Range    WBC 7.1 4.5 - 11.5 E9/L    RBC 5.05 3.80 - 5.80 E12/L    Hemoglobin 14.5 12.5 - 16.5 g/dL    Hematocrit 43.2 37.0 - 54.0 %    MCV 85.5 80.0 - 99.9 fL    MCH 28.7 26.0 - 35.0 pg    MCHC 33.6 32.0 - 34.5 %    RDW 13.2 11.5 - 15.0 fL    Platelets 323 938 - 071 E9/L    MPV 9.3 7.0 - 12.0 fL   Comprehensive Metabolic Panel   Result Value Ref Range    Sodium 135 132 - 146 mmol/L    Potassium 4.5 3.5 - 5.0 mmol/L    Chloride 98 98 - 107 mmol/L    CO2 30 (H) 22 - 29 mmol/L    Anion Gap 7 7 - 16 mmol/L    Glucose 344 (H) 74 - 99 mg/dL    BUN 11 6 - 20 mg/dL    CREATININE 1.3 (H) 0.7 - 1.2 mg/dL    GFR Non-African American >60 >=60 mL/min/1.73    GFR African American >60     Calcium 9.1 8.6 - 10.2 mg/dL    Total Protein 7.3 6.4 - 8.3 g/dL    Albumin 3.8 3.5 - 5.2 g/dL    Total Bilirubin 0.5 0.0 - 1.2 mg/dL    Alkaline Phosphatase 74 40 - 129 U/L    ALT 44 (H) 0 - 40 U/L    AST 24 0 - 39 U/L   APTT   Result Value Ref Range    aPTT 26.6 24.5 - 35.1 sec   Protime-INR   Result Value Ref Range    Protime 11.7 9.3 - 12.4 sec    INR 1.1        RADIOLOGY:  Interpreted by Radiologist.  CT HEAD WO CONTRAST   Final Result   No acute intracranial abnormality. Evolving area of low attenuation left posterior parietal region in the   patient's known resection bed.             ------------------------- NURSING NOTES AND VITALS REVIEWED ---------------------------   The nursing notes within the ED encounter and vital signs as below have been reviewed.    BP (!) 151/95   Pulse 93   Temp 98.7 °F (37.1 °C) (Oral)   Resp 16   Ht 6' 2\" (1.88 m)   Wt 292 lb (132.5 kg)   SpO2 95%   BMI 37.49 kg/m²   Oxygen Saturation Interpretation: Normal      ---------------------------------------------------PHYSICAL EXAM--------------------------------------      Constitutional/General: Alert and oriented x3, well appearing, non toxic in NAD  Head: Normocephalic and atraumatic  Eyes: PERRL, EOMI  Mouth: Oropharynx clear, handling secretions, no trismus  Neck: Supple, full ROM,   Pulmonary: Lungs clear to auscultation bilaterally, no wheezes, rales, or rhonchi. Not in respiratory distress  Cardiovascular:  Regular rate and rhythm, no murmurs, gallops, or rubs. 2+ distal pulses  Abdomen: Soft, non tender, non distended,   Extremities: Moves all extremities x 4. Warm and well perfused. Left lower extremity: Mild swelling to the leg, dorsalis pedis 2+, no signs of ischemic limb or compartment syndrome  Skin: warm and dry without rash  Neurologic: GCS 15,  Psych: Normal Affect      ------------------------------ ED COURSE/MEDICAL DECISION MAKING----------------------  Medications   enoxaparin (LOVENOX) injection 135 mg (has no administration in time range)         ED COURSE:       Medical Decision Making:    Labs and imaging reviewed. Patient does have a DVT. Discussed with Dr. Chon Orta from the [place] [id number]. Recommends a CT to rule out any hemorrhage, if negative, okay to start anticoagulation. CT obtained, no acute pathology. No hemorrhage per radiology report. Patient was given Lovenox. He is educated on Eliquis, including risks of bleeding and signs of bleeding, he voices understanding, he is okay being on Eliquis. He is to follow-up with his PCP along with his neurosurgeon, he is educated on signs and symptoms that require emergent evaluation. Counseling: The emergency provider has spoken with the patient and discussed todays results, in addition to providing specific details for the plan of care and counseling regarding the diagnosis and prognosis. Questions are answered at this time and they are agreeable with the plan.      --------------------------------- IMPRESSION AND DISPOSITION ---------------------------------    IMPRESSION  1. Acute deep vein thrombosis (DVT) of proximal vein of lower extremity, unspecified laterality ([address]        DISPOSITION  Disposition: Discharge to home  Patient condition is stable      NOTE: This report was transcribed using voice recognition software.  Every effort was made to ensure accuracy; however, inadvertent computerized transcription errors may be present        Kasia Monk MD  09/09/21 6738

## 2021-09-10 ENCOUNTER — APPOINTMENT (OUTPATIENT)
Dept: GENERAL RADIOLOGY | Age: 46
End: 2021-09-10
Payer: COMMERCIAL

## 2021-09-10 ENCOUNTER — HOSPITAL ENCOUNTER (EMERGENCY)
Age: 46
Discharge: HOME | End: 2021-09-10
Attending: STUDENT IN AN ORGANIZED HEALTH CARE EDUCATION/TRAINING PROGRAM
Payer: COMMERCIAL

## 2021-09-10 VITALS
TEMPERATURE: 98.4 F | RESPIRATION RATE: 16 BRPM | WEIGHT: 282 LBS | HEART RATE: 84 BPM | BODY MASS INDEX: 36.19 KG/M2 | DIASTOLIC BLOOD PRESSURE: 84 MMHG | OXYGEN SATURATION: 100 % | SYSTOLIC BLOOD PRESSURE: 156 MMHG | HEIGHT: 74 IN

## 2021-09-10 DIAGNOSIS — R53.81 MALAISE: ICD-10-CM

## 2021-09-10 DIAGNOSIS — R73.9 HYPERGLYCEMIA: Primary | ICD-10-CM

## 2021-09-10 LAB
ALBUMIN SERPL-MCNC: 3.7 G/DL (ref 3.5–5.2)
ALP BLD-CCNC: 78 U/L (ref 40–129)
ALT SERPL-CCNC: 29 U/L (ref 0–40)
ANION GAP SERPL CALCULATED.3IONS-SCNC: 11 MMOL/L (ref 7–16)
AST SERPL-CCNC: 14 U/L (ref 0–39)
BASOPHILS ABSOLUTE: 0.01 E9/L (ref 0–0.2)
BASOPHILS RELATIVE PERCENT: 0.2 % (ref 0–2)
BILIRUB SERPL-MCNC: 0.5 MG/DL (ref 0–1.2)
BILIRUBIN URINE: NEGATIVE
BLOOD, URINE: NEGATIVE
BUN BLDV-MCNC: 14 MG/DL (ref 6–20)
CALCIUM SERPL-MCNC: 9.4 MG/DL (ref 8.6–10.2)
CHLORIDE BLD-SCNC: 99 MMOL/L (ref 98–107)
CHP ED QC CHECK: YES
CLARITY: CLEAR
CO2: 23 MMOL/L (ref 22–29)
COLOR: YELLOW
CREAT SERPL-MCNC: 1 MG/DL (ref 0.7–1.2)
EOSINOPHILS ABSOLUTE: 0.15 E9/L (ref 0.05–0.5)
EOSINOPHILS RELATIVE PERCENT: 2.3 % (ref 0–6)
GFR AFRICAN AMERICAN: >60
GFR NON-AFRICAN AMERICAN: >60 ML/MIN/1.73
GLUCOSE BLD-MCNC: 350 MG/DL
GLUCOSE BLD-MCNC: 468 MG/DL (ref 74–99)
GLUCOSE URINE: >=1000 MG/DL
HCT VFR BLD CALC: 42.3 % (ref 37–54)
HEMOGLOBIN: 14.5 G/DL (ref 12.5–16.5)
IMMATURE GRANULOCYTES #: 0.02 E9/L
IMMATURE GRANULOCYTES %: 0.3 % (ref 0–5)
KETONES, URINE: NEGATIVE MG/DL
LEUKOCYTE ESTERASE, URINE: NEGATIVE
LYMPHOCYTES ABSOLUTE: 1.41 E9/L (ref 1.5–4)
LYMPHOCYTES RELATIVE PERCENT: 21.8 % (ref 20–42)
MAGNESIUM: 2 MG/DL (ref 1.6–2.6)
MCH RBC QN AUTO: 29.2 PG (ref 26–35)
MCHC RBC AUTO-ENTMCNC: 34.3 % (ref 32–34.5)
MCV RBC AUTO: 85.3 FL (ref 80–99.9)
METER GLUCOSE: 350 MG/DL (ref 74–99)
MONOCYTES ABSOLUTE: 0.69 E9/L (ref 0.1–0.95)
MONOCYTES RELATIVE PERCENT: 10.6 % (ref 2–12)
NEUTROPHILS ABSOLUTE: 4.2 E9/L (ref 1.8–7.3)
NEUTROPHILS RELATIVE PERCENT: 64.8 % (ref 43–80)
NITRITE, URINE: NEGATIVE
PDW BLD-RTO: 12.9 FL (ref 11.5–15)
PH UA: 7 (ref 5–9)
PLATELET # BLD: 210 E9/L (ref 130–450)
PMV BLD AUTO: 9.4 FL (ref 7–12)
POTASSIUM REFLEX MAGNESIUM: 4.2 MMOL/L (ref 3.5–5)
PROTEIN UA: NEGATIVE MG/DL
RBC # BLD: 4.96 E12/L (ref 3.8–5.8)
SARS-COV-2, NAAT: NOT DETECTED
SODIUM BLD-SCNC: 133 MMOL/L (ref 132–146)
SPECIFIC GRAVITY UA: 1.01 (ref 1–1.03)
TOTAL PROTEIN: 7.2 G/DL (ref 6.4–8.3)
TROPONIN, HIGH SENSITIVITY: 10 NG/L (ref 0–11)
TROPONIN, HIGH SENSITIVITY: 9 NG/L (ref 0–11)
UROBILINOGEN, URINE: 0.2 E.U./DL
WBC # BLD: 6.5 E9/L (ref 4.5–11.5)

## 2021-09-10 PROCEDURE — 96372 THER/PROPH/DIAG INJ SC/IM: CPT

## 2021-09-10 PROCEDURE — 71045 X-RAY EXAM CHEST 1 VIEW: CPT

## 2021-09-10 PROCEDURE — 84484 ASSAY OF TROPONIN QUANT: CPT

## 2021-09-10 PROCEDURE — 87635 SARS-COV-2 COVID-19 AMP PRB: CPT

## 2021-09-10 PROCEDURE — 2580000003 HC RX 258: Performed by: STUDENT IN AN ORGANIZED HEALTH CARE EDUCATION/TRAINING PROGRAM

## 2021-09-10 PROCEDURE — 85025 COMPLETE CBC W/AUTO DIFF WBC: CPT

## 2021-09-10 PROCEDURE — 83735 ASSAY OF MAGNESIUM: CPT

## 2021-09-10 PROCEDURE — 6370000000 HC RX 637 (ALT 250 FOR IP): Performed by: STUDENT IN AN ORGANIZED HEALTH CARE EDUCATION/TRAINING PROGRAM

## 2021-09-10 PROCEDURE — 99283 EMERGENCY DEPT VISIT LOW MDM: CPT

## 2021-09-10 PROCEDURE — 93005 ELECTROCARDIOGRAM TRACING: CPT | Performed by: STUDENT IN AN ORGANIZED HEALTH CARE EDUCATION/TRAINING PROGRAM

## 2021-09-10 PROCEDURE — 80053 COMPREHEN METABOLIC PANEL: CPT

## 2021-09-10 PROCEDURE — 82962 GLUCOSE BLOOD TEST: CPT

## 2021-09-10 PROCEDURE — 81003 URINALYSIS AUTO W/O SCOPE: CPT

## 2021-09-10 PROCEDURE — 87040 BLOOD CULTURE FOR BACTERIA: CPT

## 2021-09-10 RX ORDER — 0.9 % SODIUM CHLORIDE 0.9 %
1000 INTRAVENOUS SOLUTION INTRAVENOUS ONCE
Status: COMPLETED | OUTPATIENT
Start: 2021-09-10 | End: 2021-09-10

## 2021-09-10 RX ORDER — POTASSIUM CHLORIDE 20 MEQ/1
20 TABLET, EXTENDED RELEASE ORAL ONCE
Status: COMPLETED | OUTPATIENT
Start: 2021-09-10 | End: 2021-09-10

## 2021-09-10 RX ADMIN — SODIUM CHLORIDE 1000 ML: 9 INJECTION, SOLUTION INTRAVENOUS at 14:30

## 2021-09-10 RX ADMIN — INSULIN LISPRO 5 UNITS: 100 INJECTION, SOLUTION INTRAVENOUS; SUBCUTANEOUS at 14:31

## 2021-09-10 RX ADMIN — POTASSIUM CHLORIDE 20 MEQ: 1500 TABLET, EXTENDED RELEASE ORAL at 14:31

## 2021-09-10 ASSESSMENT — ENCOUNTER SYMPTOMS
COUGH: 0
EYE REDNESS: 0
SHORTNESS OF BREATH: 0
NAUSEA: 0
ABDOMINAL PAIN: 0
SINUS PRESSURE: 0
DIARRHEA: 0
BACK PAIN: 0
WHEEZING: 0
SORE THROAT: 0
EYE DISCHARGE: 0
EYE PAIN: 0
VOMITING: 0

## 2021-09-10 NOTE — ED PROVIDER NOTES
ATTENDING PROVIDER ATTESTATION:     John Maldonado presented to the emergency department for evaluation of Fever (pt with 104 temp at home. recent brain surgery)  . The patient was initially evaluated by the Medical Resident. Please see their note for further details, HPI, and ED course. I have reviewed & discussed the patient's case in details, including pertinent history & exam findings, with the Medical Resident and have personally participated in and/or performed the history, physical examination, medical decision-making, and procedure(s). I agree with all pertinent clinical information and any changes or corrections are noted below. I have reviewed my findings and recommendations with the assigned Medical Resident, patient, and family member(s) present at the time of disposition. I have performed a history and physical examination of this patient and directly supervised the resident caring for this patient. I have directly supervised any procedures performed by the resident and was present for the procedure including all critical portions of the procedure(s).     --------------------Rampart------------------    Generalized malaise for couple of days, in the setting of craniotomy 1 month ago. Temperature of 100.4 at home with temporal thermometer. No subjective fevers. Did not take any antipyretics or other medications prior to recheck in the ED which was 98.4. Denies chills, denies headache or vision changes, neck pain or stiffness, chest pain or shortness of breath, nausea vomiting diarrhea constipation, abdominal pain. No weakness or numbness. Ambulating without difficulty. No falls or trauma. No pain at surgical site. Patient seen in the emergency department 2 days ago and diagnosed with DVT of the left lower extremity, discomfort from this is stable per patient. He is compliant with the Eliquis that he was given.       Review of Systems:   A complete review of systems was performed and reviewed all laboratory and imaging results for this patient. RADIOLOGY:  Imaging interpreted by Radiologist unless otherwise specified  XR CHEST PORTABLE   Final Result   The lungs are without acute focal process. There is no effusion or   pneumothorax. The cardiomediastinal silhouette is without acute process. The   osseous structures are without acute process. IMPRESSION:   No acute process. EKG Interpretation  Interpreted by emergency department physician, Dr. Angelica Stevenosn    Date of EK/10/21      Clinical Impression: Normal sinus rhythm, normal axis, no ST abnormalities, no T wave abnormalities, normal rate  Comparison to prior EKG: stable as compared to patient's most recent EKG      ------------------------- NURSING NOTES AND VITALS REVIEWED ---------------------------  The nursing notes within the ED encounter and vital signs as below have been reviewed by myself  BP (!) 156/84   Pulse 84   Temp 98.4 °F (36.9 °C) (Temporal)   Resp 16   Ht 6' 2\" (1.88 m)   Wt 282 lb (127.9 kg)   SpO2 100%   BMI 36.21 kg/m²      The patients available past medical records and past encounters were reviewed. ------------------------------ ED COURSE/MEDICAL DECISION MAKING----------------------      Medical Decision Making:   Generalized malaise, no celia infectious symptoms, will screen for electrolyte derangement, metabolic abnormality, ACS/MI/arrhythmia although I believe this is less likely on physical exam and history, infection with CBC, BMP, troponin, EKG, chest x-ray. Reevaluation: Patient has hyperglycemia without DKA, will give fluids small amount of insulin, suspect this will make patient feel improved, and suspect that this is the etiology for his generalized malaise. Delta troponin pending at this time. Suspect patient's steroids are contributing to the hyperglycemia as well. This patient's ED course as detailed above reviewed by me      ED Counseling:     This emergency provider has spoken with the patient and any family present to discuss clinical status, results, plan of care, diagnosis and prognosis as able to be determined at this time. Any questions were answered and patient and/or family/POA are agreeable with the plan. This report was transcribed using voice recognition software. Every effort was made to ensure accuracy; however, transcription errors may be present.        --------------------------------- IMPRESSION AND DISPOSITION ---------------------------------    IMPRESSION  1. Hyperglycemia    2. Malaise        DISPOSITION  Disposition: Discharge to home  Patient condition is good      This report was transcribed using voice recognition software. Every effort was made to ensure accuracy; however, transcription errors may be present.            Armond Elena MD  09/10/21 14 Cary Rico MD  09/10/21 14 Cary Rico MD  09/10/21 1550

## 2021-09-10 NOTE — ED PROVIDER NOTES
FIRST PROVIDER CONTACT ASSESSMENT NOTE                                                                                                Department of Emergency Medicine                                                      First Provider Note  9/10/21  12:26 PM EDT  NAME: Radha Cook  : 1975  MRN: [id number]    Chief Complaint: Fever (pt with 104 temp at home. recent brain surgery)      History of Present Illness:   Radha Cook is a 51 y.o. male who presents to the ED for fever. The patient had brain surgery on  at the [facility] to remove a brain tumor. He was actually just diagnosed with a DVT and is now on a blood thinner. Comes in today with new onset of fever. Denies other symptoms. Denies cough or shortness of breath. Temp 100.4. The patient has been vaccinated for Covid 19          Focused Physical Exam:  VS:    ED Triage Vitals   BP Temp Temp src Pulse Resp SpO2 Height Weight   -- -- -- -- -- -- -- --        General: Alert and in no apparent distress. Medical History:  has a past medical history of Hyperlipidemia, Hypertension, and Stroke ([facility] [address] Surgical History:  has no past surgical history on file. Social History:  reports that he has never smoked. He has never used smokeless tobacco. He reports current alcohol use. He reports that he does not use drugs. Family History: family history includes Cancer in his father. Allergies: Patient has no known allergies.      Initial Plan of Care:  Initiate Treatment-Testing, Proceed toTreatment Area When Bed Available for ED Attending/MLP to Continue Care    -------------------------------------------------END OF FIRST PROVIDER CONTACT ASSESSMENT NOTE--------------------------------------------------------  Electronically signed by Funmilayo Antonio PA-C   DD: 9/10/21       Funmilayo Antonio PA-C  09/10/21 1229

## 2021-09-10 NOTE — ED PROVIDER NOTES
Patient presents with fever, malaise, and fatigue. Patient states that he has had malaise and fatigue for the past couple days. He mentions that he did have a recent craniotomy within the past month. He states today when they checked his temperature he had a fever of 100.4. Patient did not take anything to make his fever better nor worse but presented to the ED. Patient also mentions that he does have some difficulties with his left leg and that he does not feel normal.  He states that he is being treated currently for a blood clot in that left leg with Eliquis. Patient is denying any chest pain, shortness of breath, nausea, vomiting, or abdominal pain. The history is provided by the patient and a significant other. No  was used. Review of Systems   Constitutional: Positive for fatigue and fever. Negative for chills. HENT: Negative for ear pain, sinus pressure and sore throat. Eyes: Negative for pain, discharge and redness. Respiratory: Negative for cough, shortness of breath and wheezing. Cardiovascular: Negative for chest pain. Gastrointestinal: Negative for abdominal pain, diarrhea, nausea and vomiting. Genitourinary: Negative for dysuria and frequency. Musculoskeletal: Negative for arthralgias and back pain. Skin: Negative for rash and wound. Neurological: Negative for weakness and headaches. Hematological: Negative for adenopathy. All other systems reviewed and are negative. Physical Exam  Vitals and nursing note reviewed. Constitutional:       General: He is not in acute distress. Appearance: He is well-developed. He is obese. HENT:      Head: Normocephalic and atraumatic. Eyes:      Conjunctiva/sclera: Conjunctivae normal.   Cardiovascular:      Rate and Rhythm: Normal rate and regular rhythm. Heart sounds: Normal heart sounds. No murmur heard. Pulmonary:      Effort: Pulmonary effort is normal. No respiratory distress. Breath sounds: Normal breath sounds. No wheezing or rales. Abdominal:      General: Bowel sounds are normal.      Palpations: Abdomen is soft. Tenderness: There is no abdominal tenderness. There is no guarding or rebound. Musculoskeletal:         General: No tenderness or deformity. Cervical back: Normal range of motion and neck supple. Right lower leg: No edema. Left lower leg: No edema. Skin:     General: Skin is warm and dry. Neurological:      General: No focal deficit present. Mental Status: He is alert and oriented to person, place, and time. Cranial Nerves: No cranial nerve deficit. Coordination: Coordination normal.          Procedures     MDM  Number of Diagnoses or Management Options  Hyperglycemia  Malaise  Diagnosis management comments: Patient presents with fever malaise and fatigue. Chest x-ray did not show any acute cardiopulmonary processes. CBC was unremarkable. Urinalysis not show any signs of infection. Covid is negative. CMP showed hyperglycemia at 468. Concern that patient is developing diabetes after her recent steroid usage. Patient was started on fluids and given 5 units of insulin. Potassium was 4.2. Potassium supplementation was given. Patient did have improvement in her sugars and symptoms. At this point there is not appear to be any emergent process ongoing. Patient is not in DKA. Patient was informed the results and plan and is agreeable. Patient be discharged with instruction to follow-up with his PCP for further evaluation and care. Amount and/or Complexity of Data Reviewed  Clinical lab tests: reviewed  Tests in the radiology section of CPT®: reviewed  Decide to obtain previous medical records or to obtain history from someone other than the patient: yes         ED Course as of Sep 10 1532   Fri Sep 10, 2021   1529 Patient reevaluated and states that he is feeling better.   Repeat glucose was obtained and did show improvement at 1 hour.    [BB]      ED Course User Index  [BB] Cosme Vargas DO        ED Course as of Sep 10 1532   Fri Sep 10, 2021   1529 Patient reevaluated and states that he is feeling better. Repeat glucose was obtained and did show improvement at 1 hour.    [BB]      ED Course User Index  [BB] Cosme Vargas DO       --------------------------------------------- PAST HISTORY ---------------------------------------------  Past Medical History:  has a past medical history of Hyperlipidemia, Hypertension, and Stroke ([facility] [place] [address] Past Surgical History:  has no past surgical history on file. Social History:  reports that he has never smoked. He has never used smokeless tobacco. He reports current alcohol use. He reports that he does not use drugs. Family History: family history includes Cancer in his father. The patients home medications have been reviewed. Allergies: Patient has no known allergies.     -------------------------------------------------- RESULTS -------------------------------------------------  Labs:  Results for orders placed or performed during the hospital encounter of 09/10/21   COVID-19, Rapid    Specimen: Nasopharyngeal Swab   Result Value Ref Range    SARS-CoV-2, NAAT Not Detected Not Detected   CBC Auto Differential   Result Value Ref Range    WBC 6.5 4.5 - 11.5 E9/L    RBC 4.96 3.80 - 5.80 E12/L    Hemoglobin 14.5 12.5 - 16.5 g/dL    Hematocrit 42.3 37.0 - 54.0 %    MCV 85.3 80.0 - 99.9 fL    MCH 29.2 26.0 - 35.0 pg    MCHC 34.3 32.0 - 34.5 %    RDW 12.9 11.5 - 15.0 fL    Platelets 709 784 - 225 E9/L    MPV 9.4 7.0 - 12.0 fL    Neutrophils % 64.8 43.0 - 80.0 %    Immature Granulocytes % 0.3 0.0 - 5.0 %    Lymphocytes % 21.8 20.0 - 42.0 %    Monocytes % 10.6 2.0 - 12.0 %    Eosinophils % 2.3 0.0 - 6.0 %    Basophils % 0.2 0.0 - 2.0 %    Neutrophils Absolute 4.20 1.80 - 7.30 E9/L    Immature Granulocytes # 0.02 E9/L    Lymphocytes Absolute 1.41 (L) 1.50 - 4.00 E9/L    Monocytes Absolute 0.69 0.10 - 0.95 E9/L    Eosinophils Absolute 0.15 0.05 - 0.50 E9/L    Basophils Absolute 0.01 0.00 - 0.20 E9/L   Comprehensive Metabolic Panel w/ Reflex to MG   Result Value Ref Range    Sodium 133 132 - 146 mmol/L    Potassium reflex Magnesium 4.2 3.5 - 5.0 mmol/L    Chloride 99 98 - 107 mmol/L    CO2 23 22 - 29 mmol/L    Anion Gap 11 7 - 16 mmol/L    Glucose 468 (H) 74 - 99 mg/dL    BUN 14 6 - 20 mg/dL    CREATININE 1.0 0.7 - 1.2 mg/dL    GFR Non-African American >60 >=60 mL/min/1.73    GFR African American >60     Calcium 9.4 8.6 - 10.2 mg/dL    Total Protein 7.2 6.4 - 8.3 g/dL    Albumin 3.7 3.5 - 5.2 g/dL    Total Bilirubin 0.5 0.0 - 1.2 mg/dL    Alkaline Phosphatase 78 40 - 129 U/L    ALT 29 0 - 40 U/L    AST 14 0 - 39 U/L   Urinalysis, reflex to microscopic   Result Value Ref Range    Color, UA Yellow Straw/Yellow    Clarity, UA Clear Clear    Glucose, Ur >=1000 (A) Negative mg/dL    Bilirubin Urine Negative Negative    Ketones, Urine Negative Negative mg/dL    Specific Gravity, UA 1.015 1.005 - 1.030    Blood, Urine Negative Negative    pH, UA 7.0 5.0 - 9.0    Protein, UA Negative Negative mg/dL    Urobilinogen, Urine 0.2 <2.0 E.U./dL    Nitrite, Urine Negative Negative    Leukocyte Esterase, Urine Negative Negative   Magnesium   Result Value Ref Range    Magnesium 2.0 1.6 - 2.6 mg/dL   Troponin   Result Value Ref Range    Troponin, High Sensitivity 10 0 - 11 ng/L   Troponin   Result Value Ref Range    Troponin, High Sensitivity 9 0 - 11 ng/L   POCT Glucose   Result Value Ref Range    Meter Glucose 350 (H) 74 - 99 mg/dL       Radiology:  XR CHEST PORTABLE   Final Result   The lungs are without acute focal process. There is no effusion or   pneumothorax. The cardiomediastinal silhouette is without acute process. The   osseous structures are without acute process. IMPRESSION:   No acute process.              ------------------------- NURSING NOTES AND VITALS REVIEWED ---------------------------  Date / Time Roomed:  9/10/2021 12:56 PM  ED Bed Assignment:  23/23    The nursing notes within the ED encounter and vital signs as below have been reviewed. BP (!) 169/93   Pulse 88   Temp 98.4 °F (36.9 °C) (Temporal)   Resp 14   Ht 6' 2\" (1.88 m)   Wt 282 lb (127.9 kg)   SpO2 100%   BMI 36.21 kg/m²   Oxygen Saturation Interpretation: Normal      ------------------------------------------ PROGRESS NOTES ------------------------------------------  3:32 PM EDT  I have spoken with the patient and discussed todays results, in addition to providing specific details for the plan of care and counseling regarding the diagnosis and prognosis. Their questions are answered at this time and they are agreeable with the plan. I discussed at length with them reasons for immediate return here for re evaluation. They will followup with their primary care physician by calling their office on Monday.      --------------------------------- ADDITIONAL PROVIDER NOTES ---------------------------------  At this time the patient is without objective evidence of an acute process requiring hospitalization or inpatient management. They have remained hemodynamically stable throughout their entire ED visit and are stable for discharge with outpatient follow-up. The plan has been discussed in detail and they are aware of the specific conditions for emergent return, as well as the importance of follow-up. New Prescriptions    No medications on file       Diagnosis:  1. Hyperglycemia    2. Malaise        Disposition:  Patient's disposition: Discharge to home  Patient's condition is stable.     Patient was seen and evaluated by both myself and Reba Kang MD.           Sharif Courtney, DO  Resident  09/10/21 3369

## 2021-09-11 LAB
EKG ATRIAL RATE: 88 BPM
EKG P AXIS: 25 DEGREES
EKG P-R INTERVAL: 152 MS
EKG Q-T INTERVAL: 342 MS
EKG QRS DURATION: 92 MS
EKG QTC CALCULATION (BAZETT): 413 MS
EKG R AXIS: -22 DEGREES
EKG T AXIS: -7 DEGREES
EKG VENTRICULAR RATE: 88 BPM

## 2021-09-11 PROCEDURE — 93010 ELECTROCARDIOGRAM REPORT: CPT | Performed by: INTERNAL MEDICINE

## 2021-09-15 LAB
BLOOD CULTURE, ROUTINE: NORMAL
CULTURE, BLOOD 2: NORMAL

## 2021-09-23 ENCOUNTER — OFFICE VISIT (OUTPATIENT)
Dept: VASCULAR SURGERY | Age: 46
End: 2021-09-23
Payer: COMMERCIAL

## 2021-09-23 VITALS — SYSTOLIC BLOOD PRESSURE: 124 MMHG | DIASTOLIC BLOOD PRESSURE: 86 MMHG

## 2021-09-23 DIAGNOSIS — M79.89 LEG SWELLING: ICD-10-CM

## 2021-09-23 DIAGNOSIS — Z98.890 S/P CRANIOTOMY: ICD-10-CM

## 2021-09-23 DIAGNOSIS — I82.412 ACUTE DEEP VEIN THROMBOSIS (DVT) OF FEMORAL VEIN OF LEFT LOWER EXTREMITY (HCC): ICD-10-CM

## 2021-09-23 PROCEDURE — G8417 CALC BMI ABV UP PARAM F/U: HCPCS | Performed by: SURGERY

## 2021-09-23 PROCEDURE — G8427 DOCREV CUR MEDS BY ELIG CLIN: HCPCS | Performed by: SURGERY

## 2021-09-23 PROCEDURE — 1036F TOBACCO NON-USER: CPT | Performed by: SURGERY

## 2021-09-23 PROCEDURE — 99204 OFFICE O/P NEW MOD 45 MIN: CPT | Performed by: SURGERY

## 2021-09-23 RX ORDER — ACETAMINOPHEN 500 MG
500 TABLET ORAL EVERY 6 HOURS PRN
COMMUNITY
End: 2022-06-08

## 2021-09-23 NOTE — PROGRESS NOTES
Chief Complaint:   Chief Complaint   Patient presents with    Surgical Consult     DVT (L) leg, was on Eliquis - was DC 9-22-21 and put on Lovenox due to leg swelling. 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    Patient still has some swelling of the left leg, and currently not wearing any stockings    Patient denies any previous history of deep vein thrombosis    Patient denies any chest pain or shortness of breath      Patient denies any focal lateralizing neurological symptoms like loss of speech, vision or loss of function of extremity    Patient can walk short distances slowly, and denies any symptoms of rest pain    No Known Allergies    Current Outpatient Medications   Medication Sig Dispense Refill    enoxaparin (LOVENOX) 120 MG/0.8ML injection Inject 120 mg into the skin every 12 hours      acetaminophen (TYLENOL) 500 MG tablet Take 500 mg by mouth every 6 hours as needed for Pain      [facility] & Supp[facility] ) MISC Knee high with 20- 30 mmhg of compression 1 each 10    levETIRAcetam (KEPPRA) 500 MG tablet Take 1,000 mg by mouth 2 times daily      rosuvastatin (CRESTOR) 5 MG tablet Take 5 mg by mouth every other day       losartan (COZAAR) 25 MG tablet Take 50 mg by mouth daily       montelukast (SINGULAIR) 10 MG tablet Take 10 mg by mouth nightly       apixaban (ELIQUIS) 5 MG TABS tablet 10mg BID for 7 days, then 5mg BID. Disp QS for 30 days, Refill Zero.  (Patient not taking: Reported on 9/23/2021) 74 tablet 0    topiramate (TOPAMAX) 25 MG tablet Take 25 mg by mouth 2 times daily For 10days (Patient not taking: Reported on 9/23/2021)       No current facility-administered medications for this visit. Past Medical History:   Diagnosis Date    Acute deep vein thrombosis (DVT) of femoral vein of left lower extremity ([facility] [address] 9/23/2021    Brain tumor (benign) ([facility] [address]     DVT of leg (deep venous thrombosis) ([facility] [address]     Hyperlipidemia     Hypertension     Leg swelling 9/23/2021    S/P craniotomy 9/23/2021       Past Surgical History:   Procedure Laterality Date    CRANIOTOMY  08/11/2021       Family History   Problem Relation Age of Onset    Cancer Father        Social History     Socioeconomic History    Marital status: Single     Spouse name: Not on file    Number of children: Not on file    Years of education: Not on file    Highest education level: Not on file   Occupational History    Not on file   Tobacco Use    Smoking status: Never Smoker    Smokeless tobacco: Never Used   Vaping Use    Vaping Use: Never used   Substance and Sexual Activity    Alcohol use: Yes     Alcohol/week: 0.0 standard drinks     Comment: rarely    Drug use: No    Sexual activity: Not on file   Other Topics Concern    Not on file   Social History Narrative    Not on file     Social Determinants of Health     Financial Resource Strain:     Difficulty of Paying Living Expenses:    Food Insecurity:     Worried About Running Out of Food in the Last Year:     [address] in the Last Year:    Transportation Needs:     Lack of Transportation (Medical):      Lack of Transportation (Non-Medical):    Physical Activity:     Days of Exercise per Week:     Minutes of Exercise per Session:    Stress:     Feeling of Stress :    Social Connections:     Frequency of Communication with Friends and Family:     Frequency of Social Gatherings with Friends and Family:     Attends Mandaeism Services:     Active Member of Clubs or Organizations:     Attends Club or Organization Meetings:     Marital Status:    Intimate Partner Violence:     Fear of Current or Ex-Partner:     Emotionally Abused:     Physically Abused:     Sexually Abused:        Review of Systems:  Skin:  No abnormal pigmentation or rash  Eyes:  No blurring, diplopia or vision loss  Ears/Nose/Throat:  No hearing loss or vertigo  Respiratory:  No cough, pleuritic chest pain, dyspnea, or wheezing. History of asthma  Cardiovascular: No angina, palpitations . Hyperlipidemia  Gastrointestinal:  No nausea or vomiting; no abdominal pain or rectal bleeding  Musculoskeletal:  No arthritis or weakness. Neurologic:  No paralysis, paresis, paresthesia, seizures or headaches. History of craniotomy for grade 3 astrocytoma the [facility]  Hematologic/Lymphatic/Immunologic:  No anemia, abnormal bleeding/bruising, fever, chills or night sweats. Endocrine:  No heat or cold intolerance. No polyphagia, polydipsia or polyuria. Physical Exam:  General appearance:  Alert, awake, oriented x 3. No distress. Skin:  Warm and dry  Head:  Normocephalic. No masses, lesions or tenderness  Eyes:  Conjunctivae appear normal; PERRL  Ears:  External ears normal  Nose/Sinuses:  Septum midline, mucosa normal; no drainage  Oropharynx:  Clear, no exudate noted  Neck:  No jugular venous distention, lymphadenopathy or thyromegaly. No evidence of carotid bruit  Lungs:  Clear to ausculation bilaterally. No rhonchi, crackles, wheezes  Heart:  Regular rate and rhythm. No rub or murmur  Abdomen:  Soft, non-tender. No masses, organomegaly. Musculoskeletal : No joint effusions, tenderness swelling    Neuro: Speech is intact. Moving all extremities. No focal motor or sensory deficits      Extremities:  Both feet are warm to touch.  The color of both feet is normal.    Mild to moderate swelling of the left leg noted mainly below the knee    Pulses Right Left    Brachial 3 3    Radial    3=normal   Femoral 2 2  2=diminished   Popliteal    1=barely palpable   Dorsalis pedis 2 2  0=absent   Posterior tibial    4=aneurysmal             Other pertinent information:1. The past medical records were reviewed. 2.  The venous ultrasound study done at [facility] is personally reviewed by me, evidence of acute deep vein thrombosis of the calf popliteal vein and the superficial femoral vein noted    Assessment:    1. Acute deep vein thrombosis (DVT) of femoral vein of left lower extremity (HCC)    2. Leg swelling    3. S/P craniotomy              Plan:       Discussed the patient and his Alexa Claudia, telephone #[phone] that came with the patient, options, risks benefits and alternatives were explained    Recommended him continue anticoagulation, for 4 to 6 months, Lovenox as prescribed by his consultants in the clinic, to monitor carefully with his doctors in [facility], notify if any changes in his clinical condition    Also informed him, to wear compression stockings during the daytime for the left leg swelling and call me if you have any getting symptoms    Informed him, if patient has any bleeding issues while on anticoagulation, consideration could be given at the time for the placement of vena cava filter    The importance of close collaboration with his doctors in clinic was emphasized because of recent craniotomy for grade 3 astrocytoma of the brain     All the questions were answered. Orders Placed This Encounter   Medications    Elastic Bandages & Supports (ZABRINAT KNEE HIGH COMPRESSION ) MISC     Sig: Knee high with 20- 30 mmhg of compression     Dispense:  1 each     Refill:  10           Indicated follow-up: Return in about 6 months (around 3/23/2022), or if symptoms worsen or fail to improve.

## 2021-10-05 ENCOUNTER — TELEPHONE (OUTPATIENT)
Dept: VASCULAR SURGERY | Age: 46
End: 2021-10-05

## 2021-10-05 NOTE — TELEPHONE ENCOUNTER
Returned YUAN Bar at [facility], message regarding anti-coag. Message left on Norah's voicemail that Dr. Singh Curry recommendation would be for the pt to continue current anticoagulation (Lovenox) for 4-6 months.
lateral recumbent

## 2021-11-05 ENCOUNTER — HOSPITAL ENCOUNTER (EMERGENCY)
Age: 46
Discharge: HOME | End: 2021-11-05
Attending: EMERGENCY MEDICINE
Payer: COMMERCIAL

## 2021-11-05 VITALS
BODY MASS INDEX: 35.43 KG/M2 | HEIGHT: 75 IN | DIASTOLIC BLOOD PRESSURE: 88 MMHG | TEMPERATURE: 97.8 F | HEART RATE: 73 BPM | RESPIRATION RATE: 18 BRPM | OXYGEN SATURATION: 98 % | WEIGHT: 285 LBS | SYSTOLIC BLOOD PRESSURE: 143 MMHG

## 2021-11-05 DIAGNOSIS — R73.9 HYPERGLYCEMIA: Primary | ICD-10-CM

## 2021-11-05 DIAGNOSIS — R35.89 POLYURIA: ICD-10-CM

## 2021-11-05 DIAGNOSIS — R79.89 ELEVATED SERUM CREATININE: ICD-10-CM

## 2021-11-05 LAB
ALBUMIN SERPL-MCNC: 4 G/DL (ref 3.5–5.2)
ALP BLD-CCNC: 56 U/L (ref 40–129)
ALT SERPL-CCNC: 22 U/L (ref 0–40)
ANION GAP SERPL CALCULATED.3IONS-SCNC: 11 MMOL/L (ref 7–16)
AST SERPL-CCNC: 17 U/L (ref 0–39)
BASOPHILS ABSOLUTE: 0.01 E9/L (ref 0–0.2)
BASOPHILS RELATIVE PERCENT: 0.3 % (ref 0–2)
BILIRUB SERPL-MCNC: 0.4 MG/DL (ref 0–1.2)
BILIRUBIN URINE: NEGATIVE
BLOOD, URINE: NEGATIVE
BUN BLDV-MCNC: 10 MG/DL (ref 6–20)
CALCIUM SERPL-MCNC: 9.1 MG/DL (ref 8.6–10.2)
CHLORIDE BLD-SCNC: 105 MMOL/L (ref 98–107)
CHLORIDE URINE RANDOM: <20 MMOL/L
CLARITY: CLEAR
CO2: 29 MMOL/L (ref 22–29)
COLOR: NORMAL
CREAT SERPL-MCNC: 1.5 MG/DL (ref 0.7–1.2)
EOSINOPHILS ABSOLUTE: 0.05 E9/L (ref 0.05–0.5)
EOSINOPHILS RELATIVE PERCENT: 1.5 % (ref 0–6)
GFR AFRICAN AMERICAN: >60
GFR NON-AFRICAN AMERICAN: >60 ML/MIN/1.73
GLUCOSE BLD-MCNC: 158 MG/DL (ref 74–99)
GLUCOSE URINE: NEGATIVE MG/DL
HBA1C MFR BLD: 7.9 % (ref 4–5.6)
HCT VFR BLD CALC: 42.3 % (ref 37–54)
HEMOGLOBIN: 14.2 G/DL (ref 12.5–16.5)
IMMATURE GRANULOCYTES #: 0.01 E9/L
IMMATURE GRANULOCYTES %: 0.3 % (ref 0–5)
KETONES, URINE: NEGATIVE MG/DL
LACTIC ACID: 1.2 MMOL/L (ref 0.5–2.2)
LEUKOCYTE ESTERASE, URINE: NEGATIVE
LYMPHOCYTES ABSOLUTE: 0.97 E9/L (ref 1.5–4)
LYMPHOCYTES RELATIVE PERCENT: 29.7 % (ref 20–42)
MCH RBC QN AUTO: 29 PG (ref 26–35)
MCHC RBC AUTO-ENTMCNC: 33.6 % (ref 32–34.5)
MCV RBC AUTO: 86.3 FL (ref 80–99.9)
MONOCYTES ABSOLUTE: 0.49 E9/L (ref 0.1–0.95)
MONOCYTES RELATIVE PERCENT: 15 % (ref 2–12)
NEUTROPHILS ABSOLUTE: 1.74 E9/L (ref 1.8–7.3)
NEUTROPHILS RELATIVE PERCENT: 53.2 % (ref 43–80)
NITRITE, URINE: NEGATIVE
OSMOLALITY URINE: 124 MOSM/KG (ref 300–900)
PDW BLD-RTO: 13.9 FL (ref 11.5–15)
PH UA: 6 (ref 5–9)
PLATELET # BLD: 154 E9/L (ref 130–450)
PMV BLD AUTO: 8.7 FL (ref 7–12)
POTASSIUM REFLEX MAGNESIUM: 4 MMOL/L (ref 3.5–5)
POTASSIUM, UR: 15.9 MMOL/L
PROTEIN UA: NEGATIVE MG/DL
RBC # BLD: 4.9 E12/L (ref 3.8–5.8)
SEDIMENTATION RATE, ERYTHROCYTE: 12 MM/HR (ref 0–15)
SODIUM BLD-SCNC: 145 MMOL/L (ref 132–146)
SODIUM URINE: 23 MMOL/L
SPECIFIC GRAVITY UA: <=1.005 (ref 1–1.03)
TOTAL PROTEIN: 7.2 G/DL (ref 6.4–8.3)
UROBILINOGEN, URINE: 0.2 E.U./DL
WBC # BLD: 3.3 E9/L (ref 4.5–11.5)

## 2021-11-05 PROCEDURE — 84300 ASSAY OF URINE SODIUM: CPT

## 2021-11-05 PROCEDURE — 85651 RBC SED RATE NONAUTOMATED: CPT

## 2021-11-05 PROCEDURE — 82436 ASSAY OF URINE CHLORIDE: CPT

## 2021-11-05 PROCEDURE — 83605 ASSAY OF LACTIC ACID: CPT

## 2021-11-05 PROCEDURE — 83935 ASSAY OF URINE OSMOLALITY: CPT

## 2021-11-05 PROCEDURE — 81003 URINALYSIS AUTO W/O SCOPE: CPT

## 2021-11-05 PROCEDURE — 2580000003 HC RX 258: Performed by: PHYSICIAN ASSISTANT

## 2021-11-05 PROCEDURE — 84133 ASSAY OF URINE POTASSIUM: CPT

## 2021-11-05 PROCEDURE — 36415 COLL VENOUS BLD VENIPUNCTURE: CPT

## 2021-11-05 PROCEDURE — 99284 EMERGENCY DEPT VISIT MOD MDM: CPT

## 2021-11-05 PROCEDURE — 80053 COMPREHEN METABOLIC PANEL: CPT

## 2021-11-05 PROCEDURE — 83036 HEMOGLOBIN GLYCOSYLATED A1C: CPT

## 2021-11-05 PROCEDURE — 85025 COMPLETE CBC W/AUTO DIFF WBC: CPT

## 2021-11-05 RX ORDER — 0.9 % SODIUM CHLORIDE 0.9 %
1000 INTRAVENOUS SOLUTION INTRAVENOUS ONCE
Status: COMPLETED | OUTPATIENT
Start: 2021-11-05 | End: 2021-11-05

## 2021-11-05 NOTE — ED PROVIDER NOTES
ED Attending shared visit  CC: No                                                                                                                                      Department of Emergency Medicine   ED  Provider Note  Admit Date/RoomTime: 11/5/2021 12:25 PM  ED Room: [facility] [address]        HPI:  11/5/21,   Time: 1:12 PM EDT         Nida Mckeon is a 51 y.o. male presenting to the ED for polyuria, secondary to new onset DM, beginning few weeks ago. The complaint has been persistent, moderate in severity, and worsened by nothing. The patient presents to the emergency room via private car. He has a recent history of astrocytoma status post craniotomy at the [facility]. The patient was on Decadron postop and states that it was at that time his sugars started going up. He comes in today because of some abnormal labs at his PCP office. He states that for a few weeks now he has had increased urinary output and increased thirst.  Apparently his hemoglobin A1c was over 7. His PCP evaluation. The patient denies any nausea or vomiting. He denies headache or seizures. He is not currently on any steroids.          ROS:     Constitutional: See HPI  HENT: Negative for ear pain, sore throat and sinus pressure  Eyes: Negative for pain, discharge and redness  Respiratory:  Negative for shortness of breath, cough and wheezing  Cardiovascular: Negative for CP, edema or palpitations  Gastrointestinal: Negative for nausea, vomiting, diarrhea and abdominal distention  Genitourinary:  See HPI  Musculoskeletal: Negative for back pain and arthralgia  Skin: Negative for rash and wound  Neurological: Negative for weakness and headaches  Hematological: Negative for adenopathy    All other systems reviewed and are negative      -------------------------------- PAST HISTORY ----------------------------------  Past Medical History:  has a past medical history of Acute deep vein thrombosis (DVT) of femoral vein of left lower extremity ([facility] [address] Brain tumor (benign) ([facility] [address] DVT of leg (deep venous thrombosis) ([facility] [address] Hyperlipidemia, Hypertension, Leg swelling, and S/P craniotomy. Past Surgical History:  has a past surgical history that includes craniotomy (08/11/2021). Social History:  reports that he has never smoked. He has never used smokeless tobacco. He reports current alcohol use. He reports that he does not use drugs. Family History: family history includes Cancer in his father. The patients home medications have been reviewed. Allergies: Patient has no known allergies.     --------------------------------- RESULTS ------------------------------------------  All laboratory and radiology results have been personally reviewed by myself   LABS:  Results for orders placed or performed during the hospital encounter of 11/05/21   CBC Auto Differential   Result Value Ref Range    WBC 3.3 (L) 4.5 - 11.5 E9/L    RBC 4.90 3.80 - 5.80 E12/L    Hemoglobin 14.2 12.5 - 16.5 g/dL    Hematocrit 42.3 37.0 - 54.0 %    MCV 86.3 80.0 - 99.9 fL    MCH 29.0 26.0 - 35.0 pg    MCHC 33.6 32.0 - 34.5 %    RDW 13.9 11.5 - 15.0 fL    Platelets 070 694 - 729 E9/L    MPV 8.7 7.0 - 12.0 fL    Neutrophils % 53.2 43.0 - 80.0 %    Immature Granulocytes % 0.3 0.0 - 5.0 %    Lymphocytes % 29.7 20.0 - 42.0 %    Monocytes % 15.0 (H) 2.0 - 12.0 %    Eosinophils % 1.5 0.0 - 6.0 %    Basophils % 0.3 0.0 - 2.0 %    Neutrophils Absolute 1.74 (L) 1.80 - 7.30 E9/L    Immature Granulocytes # 0.01 E9/L    Lymphocytes Absolute 0.97 (L) 1.50 - 4.00 E9/L    Monocytes Absolute 0.49 0.10 - 0.95 E9/L    Eosinophils Absolute 0.05 0.05 - 0.50 E9/L    Basophils Absolute 0.01 0.00 - 0.20 E9/L   Comprehensive Metabolic Panel w/ Reflex to MG   Result Value Ref Range    Sodium 145 132 - 146 mmol/L    Potassium reflex Magnesium 4.0 3.5 - 5.0 mmol/L    Chloride 105 98 - 107 mmol/L    CO2 29 22 - 29 mmol/L    Anion Gap 11 7 - 16 mmol/L    Glucose 158 (H) 74 - 99 mg/dL    BUN 10 6 - 20 mg/dL    CREATININE 1.5 (H) 0.7 - 1.2 mg/dL    GFR Non-African American >60 >=60 mL/min/1.73    GFR African American >60     Calcium 9.1 8.6 - 10.2 mg/dL    Total Protein 7.2 6.4 - 8.3 g/dL    Albumin 4.0 3.5 - 5.2 g/dL    Total Bilirubin 0.4 0.0 - 1.2 mg/dL    Alkaline Phosphatase 56 40 - 129 U/L    ALT 22 0 - 40 U/L    AST 17 0 - 39 U/L   Lactic Acid, Plasma   Result Value Ref Range    Lactic Acid 1.2 0.5 - 2.2 mmol/L   Sedimentation Rate   Result Value Ref Range    Sed Rate 12 0 - 15 mm/Hr   Urinalysis   Result Value Ref Range    Color, UA Straw Straw/Yellow    Clarity, UA Clear Clear    Glucose, Ur Negative Negative mg/dL    Bilirubin Urine Negative Negative    Ketones, Urine Negative Negative mg/dL    Specific Gravity, UA <=1.005 1.005 - 1.030    Blood, Urine Negative Negative    pH, UA 6.0 5.0 - 9.0    Protein, UA Negative Negative mg/dL    Urobilinogen, Urine 0.2 <2.0 E.U./dL    Nitrite, Urine Negative Negative    Leukocyte Esterase, Urine Negative Negative       RADIOLOGY:  Interpreted by Radiologist.  No orders to display       ----------------- NURSING NOTES AND VITALS REVIEWED ---------------   The nursing notes within the ED encounter and vital signs as below have been reviewed. BP (!) 143/88   Pulse 73   Temp 97.8 °F (36.6 °C)   Resp 18   Ht 6' 3\" (1.905 m)   Wt 285 lb (129.3 kg)   SpO2 98%   BMI 35.62 kg/m²   Oxygen Saturation Interpretation: Normal      --------------------------------PHYSICAL EXAM------------------------------------      Constitutional/General: Alert and oriented x3, well appearing, non toxic in NAD  Head: NC/AT  Eyes: PERRL, EOMI  Mouth: Oropharynx clear, handling secretions, no trismus  Neck: Supple, full ROM, no meningeal signs  Pulmonary: Lungs clear to auscultation bilaterally, no wheezes, rales, or rhonchi. Not in respiratory distress  Cardiovascular:  Regular rate and rhythm, no murmurs, gallops, or rubs. 2+ distal pulses  Abdomen: Soft, + BS. No distension. Nontender. No palpable rigidity, rebound or guarding  Extremities: Moves all extremities x 4. Warm and well perfused  Skin: warm and dry without rash  Neurologic: GCS 15,  Intact. No focal deficits  Psych: Normal Affect      ------------------------ ED COURSE/MEDICAL DECISION MAKING----------------------  Medications   0.9 % sodium chloride bolus (0 mLs IntraVENous Stopped 11/5/21 [id number])         Medical Decision Making:    The patient is here today sent in by his PCP concerned about the possibility of diabetes insipidus. He has significant polyuria by description though we have not collected any sort of 24-hour sample. Dr. Herbert Bermeo did speak with an endocrinologist in [place] who suggested that the patient come to the hospital for admission so that he can begin that work-up and get a final diagnosis. We did some basic labs here and his sodium is normal.  The patient is adamant that he cannot stay in the hospital at this time. He was unaware that that was plan all along. He states that he has a very important thing to do at work so that he can keep his short-term disability. He is to call endocrinology follow-up if this further worked up or return to the ED for admission at his earliest convenience. Counseling: The emergency provider has spoken with the patient and discussed todays results, in addition to providing specific details for the plan of care and counseling regarding the diagnosis and prognosis. Questions are answered at this time and they are agreeable with the plan.      ------------------------ IMPRESSION AND DISPOSITION -------------------------------    IMPRESSION  1. Hyperglycemia    2. Elevated serum creatinine    3.  Polyuria        DISPOSITION  Disposition: Discharge to home  Patient condition is stable                   Shyanne Rushing PA-C  11/05/21 [id number]

## 2021-11-16 ENCOUNTER — APPOINTMENT (OUTPATIENT)
Dept: ULTRASOUND IMAGING | Age: 46
End: 2021-11-16
Payer: COMMERCIAL

## 2021-11-16 ENCOUNTER — HOSPITAL ENCOUNTER (OUTPATIENT)
Age: 46
Setting detail: OBSERVATION
Discharge: HOME | End: 2021-11-17
Attending: EMERGENCY MEDICINE | Admitting: INTERNAL MEDICINE
Payer: COMMERCIAL

## 2021-11-16 DIAGNOSIS — R35.89 POLYURIA: ICD-10-CM

## 2021-11-16 DIAGNOSIS — R63.1 POLYDIPSIA: Primary | ICD-10-CM

## 2021-11-16 DIAGNOSIS — E23.2 DIABETES INSIPIDUS (HCC): ICD-10-CM

## 2021-11-16 PROBLEM — C71.9 ASTROCYTOMA (HCC): Status: ACTIVE | Noted: 2021-11-16

## 2021-11-16 PROBLEM — N17.9 AKI (ACUTE KIDNEY INJURY) (HCC): Status: ACTIVE | Noted: 2021-11-16

## 2021-11-16 PROBLEM — Z86.718 HISTORY OF DVT (DEEP VEIN THROMBOSIS): Status: ACTIVE | Noted: 2021-11-16

## 2021-11-16 LAB
ALBUMIN SERPL-MCNC: 4.1 G/DL (ref 3.5–5.2)
ALP BLD-CCNC: 56 U/L (ref 40–129)
ALT SERPL-CCNC: 20 U/L (ref 0–40)
ANION GAP SERPL CALCULATED.3IONS-SCNC: 10 MMOL/L (ref 7–16)
ANION GAP SERPL CALCULATED.3IONS-SCNC: 10 MMOL/L (ref 7–16)
AST SERPL-CCNC: 16 U/L (ref 0–39)
BASOPHILS ABSOLUTE: 0.01 E9/L (ref 0–0.2)
BASOPHILS RELATIVE PERCENT: 0.3 % (ref 0–2)
BILIRUB SERPL-MCNC: 0.3 MG/DL (ref 0–1.2)
BILIRUBIN URINE: NEGATIVE
BLOOD, URINE: NEGATIVE
BUN BLDV-MCNC: 13 MG/DL (ref 6–20)
BUN BLDV-MCNC: 14 MG/DL (ref 6–20)
CALCIUM SERPL-MCNC: 9.4 MG/DL (ref 8.6–10.2)
CALCIUM SERPL-MCNC: 9.6 MG/DL (ref 8.6–10.2)
CHLORIDE BLD-SCNC: 104 MMOL/L (ref 98–107)
CHLORIDE BLD-SCNC: 105 MMOL/L (ref 98–107)
CHLORIDE URINE RANDOM: 23 MMOL/L
CHLORIDE URINE RANDOM: <20 MMOL/L
CLARITY: CLEAR
CO2: 28 MMOL/L (ref 22–29)
CO2: 30 MMOL/L (ref 22–29)
COLOR: YELLOW
CREAT SERPL-MCNC: 1.8 MG/DL (ref 0.7–1.2)
CREAT SERPL-MCNC: 1.9 MG/DL (ref 0.7–1.2)
EOSINOPHILS ABSOLUTE: 0.02 E9/L (ref 0.05–0.5)
EOSINOPHILS RELATIVE PERCENT: 0.6 % (ref 0–6)
GFR AFRICAN AMERICAN: 46
GFR AFRICAN AMERICAN: 49
GFR NON-AFRICAN AMERICAN: 46 ML/MIN/1.73
GFR NON-AFRICAN AMERICAN: 49 ML/MIN/1.73
GLUCOSE BLD-MCNC: 107 MG/DL (ref 74–99)
GLUCOSE BLD-MCNC: 114 MG/DL (ref 74–99)
GLUCOSE URINE: NEGATIVE MG/DL
HBA1C MFR BLD: 7.4 % (ref 4–5.6)
HCT VFR BLD CALC: 42.4 % (ref 37–54)
HEMOGLOBIN: 14.1 G/DL (ref 12.5–16.5)
IMMATURE GRANULOCYTES #: 0.01 E9/L
IMMATURE GRANULOCYTES %: 0.3 % (ref 0–5)
KETONES, URINE: NEGATIVE MG/DL
LEUKOCYTE ESTERASE, URINE: NEGATIVE
LYMPHOCYTES ABSOLUTE: 1.08 E9/L (ref 1.5–4)
LYMPHOCYTES RELATIVE PERCENT: 30.3 % (ref 20–42)
MCH RBC QN AUTO: 28.8 PG (ref 26–35)
MCHC RBC AUTO-ENTMCNC: 33.3 % (ref 32–34.5)
MCV RBC AUTO: 86.5 FL (ref 80–99.9)
METER GLUCOSE: 119 MG/DL (ref 74–99)
MONOCYTES ABSOLUTE: 0.63 E9/L (ref 0.1–0.95)
MONOCYTES RELATIVE PERCENT: 17.7 % (ref 2–12)
NEUTROPHILS ABSOLUTE: 1.81 E9/L (ref 1.8–7.3)
NEUTROPHILS RELATIVE PERCENT: 50.8 % (ref 43–80)
NITRITE, URINE: NEGATIVE
OSMOLALITY URINE: 131 MOSM/KG (ref 300–900)
OSMOLALITY URINE: 219 MOSM/KG (ref 300–900)
OSMOLALITY: 306 MOSM/KG (ref 285–310)
PDW BLD-RTO: 14.5 FL (ref 11.5–15)
PH UA: 6 (ref 5–9)
PLATELET # BLD: 210 E9/L (ref 130–450)
PMV BLD AUTO: 8.3 FL (ref 7–12)
POTASSIUM REFLEX MAGNESIUM: 4.2 MMOL/L (ref 3.5–5)
POTASSIUM SERPL-SCNC: 4.4 MMOL/L (ref 3.5–5)
POTASSIUM, UR: 16.3 MMOL/L
PROTEIN UA: NEGATIVE MG/DL
RBC # BLD: 4.9 E12/L (ref 3.8–5.8)
SODIUM BLD-SCNC: 143 MMOL/L (ref 132–146)
SODIUM BLD-SCNC: 144 MMOL/L (ref 132–146)
SODIUM URINE: 21 MMOL/L
SODIUM URINE: 22 MMOL/L
SPECIFIC GRAVITY UA: <=1.005 (ref 1–1.03)
TOTAL PROTEIN: 7.6 G/DL (ref 6.4–8.3)
UROBILINOGEN, URINE: 0.2 E.U./DL
WBC # BLD: 3.6 E9/L (ref 4.5–11.5)

## 2021-11-16 PROCEDURE — 85025 COMPLETE CBC W/AUTO DIFF WBC: CPT

## 2021-11-16 PROCEDURE — 6370000000 HC RX 637 (ALT 250 FOR IP): Performed by: INTERNAL MEDICINE

## 2021-11-16 PROCEDURE — G0378 HOSPITAL OBSERVATION PER HR: HCPCS

## 2021-11-16 PROCEDURE — 80048 BASIC METABOLIC PNL TOTAL CA: CPT

## 2021-11-16 PROCEDURE — 82962 GLUCOSE BLOOD TEST: CPT

## 2021-11-16 PROCEDURE — 83935 ASSAY OF URINE OSMOLALITY: CPT

## 2021-11-16 PROCEDURE — 83930 ASSAY OF BLOOD OSMOLALITY: CPT

## 2021-11-16 PROCEDURE — 2580000003 HC RX 258: Performed by: STUDENT IN AN ORGANIZED HEALTH CARE EDUCATION/TRAINING PROGRAM

## 2021-11-16 PROCEDURE — 36415 COLL VENOUS BLD VENIPUNCTURE: CPT

## 2021-11-16 PROCEDURE — 81003 URINALYSIS AUTO W/O SCOPE: CPT

## 2021-11-16 PROCEDURE — 84133 ASSAY OF URINE POTASSIUM: CPT

## 2021-11-16 PROCEDURE — 99283 EMERGENCY DEPT VISIT LOW MDM: CPT

## 2021-11-16 PROCEDURE — 80053 COMPREHEN METABOLIC PANEL: CPT

## 2021-11-16 PROCEDURE — 82436 ASSAY OF URINE CHLORIDE: CPT

## 2021-11-16 PROCEDURE — 76770 US EXAM ABDO BACK WALL COMP: CPT

## 2021-11-16 PROCEDURE — 83036 HEMOGLOBIN GLYCOSYLATED A1C: CPT

## 2021-11-16 PROCEDURE — 99222 1ST HOSP IP/OBS MODERATE 55: CPT | Performed by: INTERNAL MEDICINE

## 2021-11-16 PROCEDURE — 84300 ASSAY OF URINE SODIUM: CPT

## 2021-11-16 PROCEDURE — 6370000000 HC RX 637 (ALT 250 FOR IP): Performed by: STUDENT IN AN ORGANIZED HEALTH CARE EDUCATION/TRAINING PROGRAM

## 2021-11-16 RX ORDER — NICOTINE POLACRILEX 4 MG
15 LOZENGE BUCCAL PRN
Status: DISCONTINUED | OUTPATIENT
Start: 2021-11-16 | End: 2021-11-17 | Stop reason: HOSPADM

## 2021-11-16 RX ORDER — ACETAMINOPHEN 325 MG/1
650 TABLET ORAL EVERY 6 HOURS PRN
Status: DISCONTINUED | OUTPATIENT
Start: 2021-11-16 | End: 2021-11-17 | Stop reason: HOSPADM

## 2021-11-16 RX ORDER — ACETAMINOPHEN 650 MG/1
650 SUPPOSITORY RECTAL EVERY 6 HOURS PRN
Status: DISCONTINUED | OUTPATIENT
Start: 2021-11-16 | End: 2021-11-17 | Stop reason: HOSPADM

## 2021-11-16 RX ORDER — GLIPIZIDE 5 MG/1
5 TABLET ORAL DAILY
COMMUNITY

## 2021-11-16 RX ORDER — LEVETIRACETAM 500 MG/1
1000 TABLET ORAL 2 TIMES DAILY
Status: DISCONTINUED | OUTPATIENT
Start: 2021-11-16 | End: 2021-11-17 | Stop reason: HOSPADM

## 2021-11-16 RX ORDER — DEXTROSE MONOHYDRATE 50 MG/ML
100 INJECTION, SOLUTION INTRAVENOUS PRN
Status: DISCONTINUED | OUTPATIENT
Start: 2021-11-16 | End: 2021-11-17 | Stop reason: HOSPADM

## 2021-11-16 RX ORDER — MONTELUKAST SODIUM 10 MG/1
10 TABLET ORAL NIGHTLY
Status: DISCONTINUED | OUTPATIENT
Start: 2021-11-16 | End: 2021-11-17 | Stop reason: HOSPADM

## 2021-11-16 RX ORDER — SODIUM CHLORIDE 0.9 % (FLUSH) 0.9 %
10 SYRINGE (ML) INJECTION EVERY 12 HOURS SCHEDULED
Status: DISCONTINUED | OUTPATIENT
Start: 2021-11-16 | End: 2021-11-17 | Stop reason: HOSPADM

## 2021-11-16 RX ORDER — SODIUM CHLORIDE 0.9 % (FLUSH) 0.9 %
10 SYRINGE (ML) INJECTION PRN
Status: DISCONTINUED | OUTPATIENT
Start: 2021-11-16 | End: 2021-11-17 | Stop reason: HOSPADM

## 2021-11-16 RX ORDER — SODIUM CHLORIDE 9 MG/ML
25 INJECTION, SOLUTION INTRAVENOUS PRN
Status: DISCONTINUED | OUTPATIENT
Start: 2021-11-16 | End: 2021-11-17 | Stop reason: HOSPADM

## 2021-11-16 RX ORDER — ROSUVASTATIN CALCIUM 5 MG/1
5 TABLET, COATED ORAL EVERY OTHER DAY
Status: DISCONTINUED | OUTPATIENT
Start: 2021-11-17 | End: 2021-11-17 | Stop reason: HOSPADM

## 2021-11-16 RX ORDER — ONDANSETRON 4 MG/1
4 TABLET, ORALLY DISINTEGRATING ORAL EVERY 8 HOURS PRN
Status: DISCONTINUED | OUTPATIENT
Start: 2021-11-16 | End: 2021-11-17 | Stop reason: HOSPADM

## 2021-11-16 RX ORDER — DEXTROSE MONOHYDRATE 25 G/50ML
12.5 INJECTION, SOLUTION INTRAVENOUS PRN
Status: DISCONTINUED | OUTPATIENT
Start: 2021-11-16 | End: 2021-11-17 | Stop reason: HOSPADM

## 2021-11-16 RX ORDER — ONDANSETRON 2 MG/ML
4 INJECTION INTRAMUSCULAR; INTRAVENOUS EVERY 6 HOURS PRN
Status: DISCONTINUED | OUTPATIENT
Start: 2021-11-16 | End: 2021-11-17 | Stop reason: HOSPADM

## 2021-11-16 RX ORDER — GLIPIZIDE 5 MG/1
5 TABLET ORAL
Status: DISCONTINUED | OUTPATIENT
Start: 2021-11-16 | End: 2021-11-17 | Stop reason: HOSPADM

## 2021-11-16 RX ORDER — SENNA PLUS 8.6 MG/1
1 TABLET ORAL DAILY PRN
Status: DISCONTINUED | OUTPATIENT
Start: 2021-11-16 | End: 2021-11-17 | Stop reason: HOSPADM

## 2021-11-16 RX ORDER — DESMOPRESSIN ACETATE 0.1 MG/ML
10 SOLUTION NASAL 2 TIMES DAILY
Status: DISCONTINUED | OUTPATIENT
Start: 2021-11-16 | End: 2021-11-17 | Stop reason: HOSPADM

## 2021-11-16 RX ADMIN — DESMOPRESSIN ACETATE 10 MCG: 0.1 SOLUTION NASAL at 22:28

## 2021-11-16 RX ADMIN — SODIUM CHLORIDE, PRESERVATIVE FREE 10 ML: 5 INJECTION INTRAVENOUS at 22:28

## 2021-11-16 RX ADMIN — GLIPIZIDE 5 MG: 5 TABLET ORAL at 22:28

## 2021-11-16 RX ADMIN — MONTELUKAST SODIUM 10 MG: 10 TABLET ORAL at 22:27

## 2021-11-16 ASSESSMENT — ENCOUNTER SYMPTOMS
DIARRHEA: 0
SHORTNESS OF BREATH: 0
BACK PAIN: 0
NAUSEA: 0
VOMITING: 0
EYE PAIN: 0
SORE THROAT: 0
ABDOMINAL PAIN: 0

## 2021-11-16 ASSESSMENT — PAIN SCALES - GENERAL
PAINLEVEL_OUTOF10: 0
PAINLEVEL_OUTOF10: 0

## 2021-11-16 NOTE — ED NOTES
Report to [facility], care of patient relinquished.       Alea Elizalde RN  11/16/21 1257

## 2021-11-16 NOTE — ED PROVIDER NOTES
[facility]      Pt Name: iLzbeth Solano  MRN: [id number]  [place] 1975  Date of evaluation: 11/16/2021      CHIEF COMPLAINT       Chief Complaint   Patient presents with    Polydipsia    Polyuria        HPI  Lizbeth Solano is a 51 y.o. male history of an astrocytoma, who presents to the emergency department with complaint of polydipsia and polyuria for the past week or so. He states he was recently here last week with plans to be admitted, however he left [address] due to a work issue. His symptoms are moderate in severity and have been getting worse. He states he is currently drinking approximately a gallon of fluids each day. Denies any fevers, chills, nausea, vomiting, chest pain, shortness of breath, abdominal pain, or dysuria. He has an endocrinologist who advised him to get worked up for possible diabetes insipidus. Except as noted above the remainder of the review of systems was reviewed and negative. Review of Systems   Constitutional: Negative for chills and fever. HENT: Negative for ear pain and sore throat. Eyes: Negative for pain. Respiratory: Negative for shortness of breath. Cardiovascular: Negative for chest pain. Gastrointestinal: Negative for abdominal pain, diarrhea, nausea and vomiting. Endocrine: Positive for polydipsia and polyuria. Genitourinary: Negative for flank pain. Musculoskeletal: Negative for back pain and neck pain. Skin: Negative for rash. Neurological: Negative for headaches. Psychiatric/Behavioral: Negative for behavioral problems. The patient is not nervous/anxious. Physical Exam  Constitutional:       General: He is not in acute distress. Appearance: Normal appearance. HENT:      Head: Normocephalic and atraumatic. Right Ear: External ear normal.      Left Ear: External ear normal.      Nose: Nose normal. No rhinorrhea.       Mouth/Throat:      Mouth: Mucous membranes are moist.      Pharynx: No oropharyngeal exudate. Eyes:      Extraocular Movements: Extraocular movements intact. Conjunctiva/sclera: Conjunctivae normal.      Pupils: Pupils are equal, round, and reactive to light. Cardiovascular:      Rate and Rhythm: Normal rate and regular rhythm. Pulses: Normal pulses. Heart sounds: Normal heart sounds. Pulmonary:      Effort: Pulmonary effort is normal. No respiratory distress. Breath sounds: Normal breath sounds. Abdominal:      General: Abdomen is flat. Palpations: Abdomen is soft. Tenderness: There is no abdominal tenderness. There is no guarding or rebound. Musculoskeletal:         General: No deformity or signs of injury. Normal range of motion. Cervical back: Normal range of motion and neck supple. Skin:     General: Skin is warm and dry. Capillary Refill: Capillary refill takes less than 2 seconds. Neurological:      General: No focal deficit present. Mental Status: He is alert and oriented to person, place, and time. Mental status is at baseline. Psychiatric:         Mood and Affect: Mood normal.          Procedures     MDM   Patient is a 65-year-old male with a history of a astrocytoma, who presents to the emergency department with a complaint of polydipsia and polyuria for the past week. He is in no acute distress with stable vital signs. We ordered basic labs evaluate for possible hyponatremia. We discussed the patient with his endocrinologist Dr. Denisse Mireles who elaborated on the work-up they wanted. She stated she wanted the patient to be started on 5 mg of nasal desmopressin for treatment of diabetes insipidus. She felt it would be in his best interest to be monitored in patient to watch his electrolytes. His blood work came back negative with within normal limits sodium and glucose.   The case was discussed with the consulting endocrinologist for [facility] who agreed to admit the patient and start the desmopressin therapy. Case was discussed with Dr. Tripp Issa who agreed to admit the patient. ED Course as of 11/16/21 1855   e Nov 16, 2021   1315 Discussed the case with endocrinology. They agreed that it is okay to admit the patient for his desmopressin therapy. He will evaluate the patient in a few hours to determine how much DDAVP to give [TO]   1316 Case was discussed with Dr. Tripp Issa. He agrees to admit the patient with endocrinology on board. [TO]      ED Course User Index  [TO] Jessie Listen, DO       --------------------------------------------- PAST HISTORY ---------------------------------------------  Past Medical History:  has a past medical history of Acute deep vein thrombosis (DVT) of femoral vein of left lower extremity ([facility] [address] Brain tumor (benign) ([facility] [address] DVT of leg (deep venous thrombosis) ([facility] [address] Hyperlipidemia, Hypertension, Leg swelling, and S/P craniotomy. Past Surgical History:  has a past surgical history that includes craniotomy (08/11/2021). Social History:  reports that he has never smoked. He has never used smokeless tobacco. He reports current alcohol use. He reports that he does not use drugs. Family History: family history includes Cancer in his father. The patients home medications have been reviewed. Allergies: Patient has no known allergies.     -------------------------------------------------- RESULTS -------------------------------------------------    LABS:  Results for orders placed or performed during the hospital encounter of 11/16/21   CBC Auto Differential   Result Value Ref Range    WBC 3.6 (L) 4.5 - 11.5 E9/L    RBC 4.90 3.80 - 5.80 E12/L    Hemoglobin 14.1 12.5 - 16.5 g/dL    Hematocrit 42.4 37.0 - 54.0 %    MCV 86.5 80.0 - 99.9 fL    MCH 28.8 26.0 - 35.0 pg    MCHC 33.3 32.0 - 34.5 %    RDW 14.5 11.5 - 15.0 fL    Platelets 164 135 - 936 E9/L    MPV 8.3 7.0 - 12.0 fL    Neutrophils % 50.8 43.0 - 80.0 %    Immature Granulocytes % 0.3 0.0 - 5.0 % Lymphocytes % 30.3 20.0 - 42.0 %    Monocytes % 17.7 (H) 2.0 - 12.0 %    Eosinophils % 0.6 0.0 - 6.0 %    Basophils % 0.3 0.0 - 2.0 %    Neutrophils Absolute 1.81 1.80 - 7.30 E9/L    Immature Granulocytes # 0.01 E9/L    Lymphocytes Absolute 1.08 (L) 1.50 - 4.00 E9/L    Monocytes Absolute 0.63 0.10 - 0.95 E9/L    Eosinophils Absolute 0.02 (L) 0.05 - 0.50 E9/L    Basophils Absolute 0.01 0.00 - 0.20 E9/L   Comprehensive Metabolic Panel w/ Reflex to MG   Result Value Ref Range    Sodium 143 132 - 146 mmol/L    Potassium reflex Magnesium 4.2 3.5 - 5.0 mmol/L    Chloride 105 98 - 107 mmol/L    CO2 28 22 - 29 mmol/L    Anion Gap 10 7 - 16 mmol/L    Glucose 107 (H) 74 - 99 mg/dL    BUN 13 6 - 20 mg/dL    CREATININE 1.8 (H) 0.7 - 1.2 mg/dL    GFR Non-African American 49 >=60 mL/min/1.73    GFR African American 49     Calcium 9.4 8.6 - 10.2 mg/dL    Total Protein 7.6 6.4 - 8.3 g/dL    Albumin 4.1 3.5 - 5.2 g/dL    Total Bilirubin 0.3 0.0 - 1.2 mg/dL    Alkaline Phosphatase 56 40 - 129 U/L    ALT 20 0 - 40 U/L    AST 16 0 - 39 U/L   Urinalysis   Result Value Ref Range    Color, UA Yellow Straw/Yellow    Clarity, UA Clear Clear    Glucose, Ur Negative Negative mg/dL    Bilirubin Urine Negative Negative    Ketones, Urine Negative Negative mg/dL    Specific Gravity, UA <=1.005 1.005 - 1.030    Blood, Urine Negative Negative    pH, UA 6.0 5.0 - 9.0    Protein, UA Negative Negative mg/dL    Urobilinogen, Urine 0.2 <2.0 E.U./dL    Nitrite, Urine Negative Negative    Leukocyte Esterase, Urine Negative Negative   OSMOLALITY, URINE   Result Value Ref Range    Osmolality, Ur 131 (L) 300 - 900 mOsm/kg   URINE ELECTROLYTES   Result Value Ref Range    Sodium, Ur 22 Not Established mmol/L    Potassium, Ur 16.3 Not Established mmol/L    Chloride <20 Not Established mmol/L       RADIOLOGY:  US RETROPERITONEAL COMPLETE   Final Result   Unremarkable ultrasound of the kidneys. Minimal urinary bladder wall thickening.   Prostatic volume 17.41 mL.                 ------------------------- NURSING NOTES AND VITALS REVIEWED ---------------------------  Date / Time Roomed:  11/16/2021 10:09 AM  ED Bed Assignment:  [id number]    The nursing notes within the ED encounter and vital signs as below have been reviewed. Patient Vitals for the past 24 hrs:   BP Temp Temp src Pulse Resp SpO2 Height Weight   11/16/21 1845 (!) 166/98 97.7 °F (36.5 °C) Oral 84 16 98 %     11/16/21 1653 137/86 97.3 °F (36.3 °C)  85 14 99 %     11/16/21 0917 (!) 143/98 97.2 °F (36.2 °C) Temporal 95 14 96 % 6' 3\" (1.905 m) 290 lb (131.5 kg)       Oxygen Saturation Interpretation: Normal    ------------------------------------------ PROGRESS NOTES ------------------------------------------      Counseling:  I have spoken with the patient and discussed todays results, in addition to providing specific details for the plan of care and counseling regarding the diagnosis and prognosis. Their questions are answered at this time and they are agreeable with the plan of admission.    --------------------------------- ADDITIONAL PROVIDER NOTES ---------------------------------  Consultations:  Time: 1316. Spoke with Dr. Armaan Guadalupe. Discussed case. They will admit the patient. This patient's ED course included: a personal history and physicial examination and re-evaluation prior to disposition    This patient has remained hemodynamically stable during their ED course. Diagnosis:  1. Polydipsia    2. Polyuria        Disposition:  Patient's disposition: Admit to med/surg floor  Patient's condition is good.          [place] DO  Resident  11/16/21 0087

## 2021-11-16 NOTE — ED PROVIDER NOTES
FIRST PROVIDER CONTACT ASSESSMENT NOTE           Department of Emergency Medicine                 First Provider Note            21  9:36 AM EST    Date of Encounter: No admission date for patient encounter. Patient Name: Mychal Somers  : 1975  MRN: [id number]    Chief Complaint: Polydipsia and Polyuria      History of Present Illness:   Mychal Somers is a 51 y.o. male who presents to the ED for admission for work-up of diabetes insipidus. The patient was actually here last week for the same but at the time stated that he had to leave because of some work obligations. He actually was seen by an endocrinologist this morning who insisted again that he get admitted so that he can get this work-up going. He has a history of brain tumor with craniotomy and now with new onset of polyuria and polydipsia. They want him admitted so we can get the full 24-hour studies and be seen by endocrinology on an inpatient basis to formalize or rule out this diagnosis        Focused Physical Exam:  VS:    ED Triage Vitals [21 0917]   BP Temp Temp Source Pulse Resp SpO2 Height Weight   (!) 143/98 97.2 °F (36.2 °C) Temporal 95 14 96 % 6' 3\" (1.905 m) 290 lb (131.5 kg)        Physical Ex: Constitutional: Alert and non-toxic. Medical History:  has a past medical history of Acute deep vein thrombosis (DVT) of femoral vein of left lower extremity ([address] Brain tumor (benign) ([address] DVT of leg (deep venous thrombosis) ([address] Hyperlipidemia, Hypertension, Leg swelling, and S/P craniotomy. Surgical History:  has a past surgical history that includes craniotomy (2021). Social History:  reports that he has never smoked. He has never used smokeless tobacco. He reports current alcohol use. He reports that he does not use drugs. Family History: family history includes Cancer in his father. Allergies: Patient has no known allergies.      Initial Plan of Care: Initiate Treatment-Testing, Proceed toTreatment Area When Bed Available for ED Attending/MLP to Continue Care      ---END OF FIRST PROVIDER CONTACT ASSESSMENT NOTE---  Electronically signed by Shaheen Ma PA-C   DD: 11/16/21       Shaheen Ma PA-C  11/16/21 [id number]

## 2021-11-17 VITALS
WEIGHT: 287.6 LBS | RESPIRATION RATE: 16 BRPM | TEMPERATURE: 97.9 F | SYSTOLIC BLOOD PRESSURE: 151 MMHG | BODY MASS INDEX: 35.76 KG/M2 | DIASTOLIC BLOOD PRESSURE: 93 MMHG | HEART RATE: 87 BPM | HEIGHT: 75 IN | OXYGEN SATURATION: 98 %

## 2021-11-17 PROBLEM — I82.412 ACUTE DEEP VEIN THROMBOSIS (DVT) OF FEMORAL VEIN OF LEFT LOWER EXTREMITY (HCC): Status: RESOLVED | Noted: 2021-09-23 | Resolved: 2021-11-17

## 2021-11-17 LAB
ANION GAP SERPL CALCULATED.3IONS-SCNC: 10 MMOL/L (ref 7–16)
BUN BLDV-MCNC: 16 MG/DL (ref 6–20)
CALCIUM SERPL-MCNC: 9.1 MG/DL (ref 8.6–10.2)
CHLORIDE BLD-SCNC: 106 MMOL/L (ref 98–107)
CO2: 29 MMOL/L (ref 22–29)
CREAT SERPL-MCNC: 1.9 MG/DL (ref 0.7–1.2)
GFR AFRICAN AMERICAN: 46
GFR NON-AFRICAN AMERICAN: 46 ML/MIN/1.73
GLUCOSE BLD-MCNC: 86 MG/DL (ref 74–99)
MAGNESIUM: 2.5 MG/DL (ref 1.6–2.6)
METER GLUCOSE: 113 MG/DL (ref 74–99)
METER GLUCOSE: 141 MG/DL (ref 74–99)
METER GLUCOSE: 99 MG/DL (ref 74–99)
OSMOLALITY URINE: 168 MOSM/KG (ref 300–900)
OSMOLALITY URINE: 180 MOSM/KG (ref 300–900)
OSMOLALITY URINE: 214 MOSM/KG (ref 300–900)
OSMOLALITY: 306 MOSM/KG (ref 285–310)
PHOSPHORUS: 4.6 MG/DL (ref 2.5–4.5)
POTASSIUM SERPL-SCNC: 3.7 MMOL/L (ref 3.5–5)
SODIUM BLD-SCNC: 145 MMOL/L (ref 132–146)

## 2021-11-17 PROCEDURE — 6370000000 HC RX 637 (ALT 250 FOR IP): Performed by: STUDENT IN AN ORGANIZED HEALTH CARE EDUCATION/TRAINING PROGRAM

## 2021-11-17 PROCEDURE — G0378 HOSPITAL OBSERVATION PER HR: HCPCS

## 2021-11-17 PROCEDURE — 36415 COLL VENOUS BLD VENIPUNCTURE: CPT

## 2021-11-17 PROCEDURE — 83735 ASSAY OF MAGNESIUM: CPT

## 2021-11-17 PROCEDURE — 96372 THER/PROPH/DIAG INJ SC/IM: CPT

## 2021-11-17 PROCEDURE — 2580000003 HC RX 258: Performed by: STUDENT IN AN ORGANIZED HEALTH CARE EDUCATION/TRAINING PROGRAM

## 2021-11-17 PROCEDURE — 84100 ASSAY OF PHOSPHORUS: CPT

## 2021-11-17 PROCEDURE — 82962 GLUCOSE BLOOD TEST: CPT

## 2021-11-17 PROCEDURE — 80048 BASIC METABOLIC PNL TOTAL CA: CPT

## 2021-11-17 PROCEDURE — 83930 ASSAY OF BLOOD OSMOLALITY: CPT

## 2021-11-17 PROCEDURE — 6370000000 HC RX 637 (ALT 250 FOR IP): Performed by: INTERNAL MEDICINE

## 2021-11-17 PROCEDURE — 6360000002 HC RX W HCPCS: Performed by: STUDENT IN AN ORGANIZED HEALTH CARE EDUCATION/TRAINING PROGRAM

## 2021-11-17 PROCEDURE — 97161 PT EVAL LOW COMPLEX 20 MIN: CPT

## 2021-11-17 PROCEDURE — 83935 ASSAY OF URINE OSMOLALITY: CPT

## 2021-11-17 RX ORDER — AMLODIPINE BESYLATE 5 MG/1
5 TABLET ORAL DAILY
Qty: 30 TABLET | Refills: 0 | Status: SHIPPED | OUTPATIENT
Start: 2021-11-17 | End: 2022-05-04

## 2021-11-17 RX ORDER — AMLODIPINE BESYLATE 5 MG/1
5 TABLET ORAL DAILY
Qty: 30 TABLET | Refills: 0 | Status: SHIPPED | OUTPATIENT
Start: 2021-11-17 | End: 2021-11-17 | Stop reason: SDUPTHER

## 2021-11-17 RX ORDER — DESMOPRESSIN ACETATE 0.1 MG/ML
1 SOLUTION NASAL 2 TIMES DAILY
Qty: 5 ML | Refills: 3 | Status: SHIPPED | OUTPATIENT
Start: 2021-11-17 | End: 2021-11-18

## 2021-11-17 RX ADMIN — SODIUM CHLORIDE, PRESERVATIVE FREE 10 ML: 5 INJECTION INTRAVENOUS at 10:17

## 2021-11-17 RX ADMIN — GLIPIZIDE 5 MG: 5 TABLET ORAL at 17:23

## 2021-11-17 RX ADMIN — DESMOPRESSIN ACETATE 10 MCG: 0.1 SOLUTION NASAL at 08:22

## 2021-11-17 RX ADMIN — ENOXAPARIN SODIUM 120 MG: 120 INJECTION SUBCUTANEOUS at 14:04

## 2021-11-17 RX ADMIN — ENOXAPARIN SODIUM 120 MG: 120 INJECTION SUBCUTANEOUS at 03:04

## 2021-11-17 RX ADMIN — ROSUVASTATIN CALCIUM 5 MG: 5 TABLET, FILM COATED ORAL at 08:21

## 2021-11-17 RX ADMIN — GLIPIZIDE 5 MG: 5 TABLET ORAL at 06:12

## 2021-11-17 ASSESSMENT — PAIN SCALES - GENERAL: PAINLEVEL_OUTOF10: 0

## 2021-11-17 NOTE — PROGRESS NOTES
Bedside nurse spoke with Lori Avalos the pharmacist to verify lovenox dosing.      Electronically signed by Marta Sheikh RN on 11/17/2021 at 3:16 AM

## 2021-11-17 NOTE — CARE COORDINATION
Introduced my self and provided explanation of CM role to patient. Patient is awake, alert, and aware of current diagnosis and treatment plan including renal and endocrinology consults, serial labs per order. Patient verbalizes no concerns and identifies no areas to focus on nor barriers to discharge. He plans on a return to home with his fiancee. He is independently ambulating in room. Patient is established with a pcp and denies any issue with retail pharmaceutical coverage. He is hopeful for discharge as soon as today and denies immediate and post discharge needs. Will follow along with  and assist with discharge planning as necessary. Cheyanne Roberts.  Jose, MSN, RN  [facility] Case Management  [phone]

## 2021-11-17 NOTE — H&P
Internal Medicine History & Physical     Name: Khloe Bonilla  : 1975  Chief Complaint: Polydipsia and Polyuria  Primary Care Physician: Logan Uriarte MD  Admission date: 2021  Date of service: 2021     History of Present Illness  Earle Linder is a 51y.o. year old male. Patient states that he has been having increased urination for the past week and a half. He states the symptoms have been constant and not progressively worsening. He notes associated polydipsia and states that he feels his mouth is always dry. Symptoms are mild in severity. Patient denies any aggravating or relieving factors. Of note, patient was diagnosed with new onset diabetes roughly 2 weeks ago. Patient also had a craniotomy which removed a brain tumor in early August of this year. He states he is currently undergoing chemo and radiation. He was started on desmopressin last night which he feels may have helped his symptoms somewhat. He states he was going to the bathroom less often but producing more urine on each occasion. Patient denies any fever, chills, nausea, vomiting, chest or abdominal pain. There are no family or friends at bedside. The history is provided by patient who is felt to be a good historian. ED course:   Initial blood work and imaging studies performed. Admission recommended by ED physician. Case discussed with ED provider. Meds in ED consisted of the following: none       Past Medical History:   Diagnosis Date    Acute deep vein thrombosis (DVT) of femoral vein of left lower extremity ([address] 2021    Brain tumor (benign) ([address]     DVT of leg (deep venous thrombosis) ([address]     Hyperlipidemia     Hypertension     Leg swelling 2021    S/P craniotomy 2021       Past Surgical History:   Procedure Laterality Date    CRANIOTOMY  2021       Family History   Problem Relation Age of Onset    Cancer Father        Social History  Patient lives at home with his fiance. Employment:   Illicit drug use- denies  TOBACCO:   reports that he has never smoked. He has never used smokeless tobacco.  ETOH:   reports current alcohol use. Home Medications  Prior to Admission medications    Medication Sig Start Date End Date Taking? Authorizing Provider   glipiZIDE (GLUCOTROL) 5 MG tablet Take 5 mg by mouth 2 times daily (before meals)   Yes Historical Provider, MD   metFORMIN (GLUCOPHAGE) 500 MG tablet Take 500 mg by mouth daily In evening   Yes Historical Provider, MD   enoxaparin (LOVENOX) 120 MG/0.8ML injection Inject 120 mg into the skin every 12 hours 9/22/21  Yes Historical Provider, MD   acetaminophen (TYLENOL) 500 MG tablet Take 500 mg by mouth every 6 hours as needed for Pain   Yes Historical Provider, MD   levETIRAcetam (KEPPRA) 500 MG tablet Take 1,000 mg by mouth 2 times daily Pt is currently being weaned off keppra   Yes Historical Provider, MD   rosuvastatin (CRESTOR) 5 MG tablet Take 5 mg by mouth every other day    Yes Historical Provider, MD   losartan (COZAAR) 25 MG tablet Take 50 mg by mouth daily    Yes Historical Provider, MD   montelukast (SINGULAIR) 10 MG tablet Take 10 mg by mouth nightly  9/16/16  Yes Historical Provider, MD   Elastic Bandages & Supports (JOBST KNEE HIGH COMPRESSION ) MISC Knee high with 20- 30 mmhg of compression 9/23/21   Gonzalez Cherry MD       Allergies  No Known Allergies    Review of Systems   Please see HPI above. All bolded are positive. All un-bolded are negative.   Constitutional Symptoms: fever, chills, fatigue, generalized weakness, diaphoresis, increase in thirst, loss of appetite  Eyes: vision change   Ears, Nose, Mouth, Throat: hearing loss, nasal congestion, sores in the mouth  Cardiovascular: chest pain, chest heaviness, palpitations  Respiratory: shortness of breath, wheezing, coughing  Gastrointestinal: abdominal pain, nausea, vomiting, diarrhea, constipation, melena, hematochezia, hematemesis  Genitourinary: dysuria, hematuria, increased frequency  Musculoskeletal: lower extremity edema, myalgias, arthralgias, back pain  Integumentary: rashes, itching   Neurological: headache, lightheadedness, dizziness, confusion, syncope, numbness, tingling, focal weakness  Psychiatric: depression, suicidal ideation, anxiety  Endocrine: unintentional weight change  Hematologic/Lymphatic: lymphadenopathy, easy bruising, easy bleeding   Allergic/Immunologic: recurrent infections      Objective  VITALS:  BP (!) 151/93   Pulse 87   Temp 97.9 °F (36.6 °C) (Oral)   Resp 16   Ht 6' 3\" (1.905 m)   Wt 287 lb 9.6 oz (130.5 kg)   SpO2 98%   BMI 35.95 kg/m²     Physical Exam:  General: awake, alert, oriented to person, place, time, and purpose, appears stated age, cooperative, no acute distress, pleasant, appropriate mood  Eyes: conjunctivae/corneas clear, sclera non icteric, EOMI  Ears: no obvious scars, no lesions, no masses, hearing intact  Mouth: mucous membranes moist, no obvious oral sores  Head: normocephalic, atraumatic  Neck: no JVD, no adenopathy, no thyromegaly, neck is supple, trachea is midline  Back: ROM normal, no CVA tenderness.   Chest: no pain on palpation  Lungs: clear to auscultation bilaterally, without rhonchi, crackle, wheezing, or rale, no retractions or use of accessory muscles  Heart: regular rate and regular rhythm, no murmur, normal S1, S2  Abdomen: soft, non-tender; bowel sounds normal; no masses, no organomegaly  : Deferred   Extremities: no lower extremity edema, extremities atraumatic, no cyanosis, no clubbing, 2+ pedal pulses palpated  Skin: normal color, normal texture, normal turgor, no rashes, no lesions  Neurologic:5/5 muscle strength throughout, normal muscle tone throughout, face symmetric, hearing intact, tongue midline, speech appropriate without slurring, sensation to fine touch intact in upper and lower extremities    Labs-   Lab Results   Component Value Date    WBC 3.6 (L) 11/16/2021    HGB 14.1 11/16/2021    HCT 42.4 11/16/2021     11/16/2021     11/17/2021    K 3.7 11/17/2021     11/17/2021    CREATININE 1.9 (H) 11/17/2021    BUN 16 11/17/2021    CO2 29 11/17/2021    GLUCOSE 86 11/17/2021    ALT 20 11/16/2021    AST 16 11/16/2021    INR 1.1 09/08/2021     Lab Results   Component Value Date    TROPONINI <0.01 02/06/2020       Recent Radiological Studies:  US RETROPERITONEAL COMPLETE   Final Result   Unremarkable ultrasound of the kidneys. Minimal urinary bladder wall thickening. Prostatic volume 17.41 mL. Assessment  Active Hospital Problems    Diagnosis     KACY (acute kidney injury) ([address] [N17.9]     History of DVT (deep vein thrombosis) [Z86.718]     Astrocytoma ([address] [C71.9]     Polyuria [R35.89]     Polydipsia [R63.1]     Diabetes insipidus ([address] [E23.2]     Leg swelling [M79.89]     S/P craniotomy [Z98.890]     Presence of cardiac device [Z95.818]     Cryptogenic stroke [facility]) [I63.9]        Patient Active Problem List    Diagnosis Date Noted    KACY (acute kidney injury) ([address] 11/16/2021    History of DVT (deep vein thrombosis) 11/16/2021    Astrocytoma ([address] 11/16/2021    Polyuria 11/16/2021    Polydipsia 11/16/2021    Diabetes insipidus ([address] 11/16/2021    Leg swelling 09/23/2021    S/P craniotomy 09/23/2021     Underwent craniotomy, for astrocytoma, in [place], 11 August      Presence of cardiac device 04/29/2016    Cryptogenic stroke ([address] 02/18/2016     1. s/p Linq implant 2/18/15           Plan  · Diabetes insipidus w/ KACY on CKD3:   · Follow BMP. Urine studies noted/pending. · Renal following/managing DDAVP. · Endocrinology consult pending. · DM, controlled: Continue home DM meds-- adjust as needed. SSI. HbA1c 7.4%. · H/o DVT: Therapeutic Lovenox. · H/o astrocytoma: sp removal 8/11/21. Keppra. · Continue home medications  · PT AM PAC 24/24  · Follow labs  · DVT prophylaxis.   · Please see orders for further management and

## 2021-11-17 NOTE — PROGRESS NOTES
Perfect serve to dr. Jhonatan Ramey about patient requesting losartan to be ordered     Electronically signed by Lorenza Nageotte, RN on 11/17/2021 at 8:25 AM

## 2021-11-17 NOTE — PROGRESS NOTES
Occupational Therapy      Occupational Thera[py referral received. Spoke with patient, explained reason for visit.  Patient reports , able to walk to/from bathroom, complete own selfcare  At this time, no OT needs   OT order discontinued - patient agreed

## 2021-11-17 NOTE — PROGRESS NOTES
Notified Dr. Jagdeep Rojas that 4 urine samples were collected and sent to lab but only 3 resulted. He does not want the sequence restarted.      Electronically signed by Sam Balderas RN on 11/17/2021 at 7:10 AM

## 2021-11-17 NOTE — PATIENT CARE CONFERENCE
[facility] Quality Flow/Interdisciplinary Rounds Progress Note        Quality Flow Rounds held on November 17, 2021    Disciplines Attending:  Bedside Nurse, ,  and Nursing Unit [address] was admitted on 11/16/2021 10:09 AM    Anticipated Discharge Date:  Expected Discharge Date: 11/19/21    Disposition:    Darian Score:  Darian Scale Score: 20    Readmission Risk              Risk of Unplanned Readmission:  0           Discussed patient goal for the day, patient clinical progression, and barriers to discharge.   The following Goal(s) of the Day/Commitment(s) have been identified:  Diagnostics - Report Results      Mayo Saravia RN  November 17, 2021

## 2021-11-17 NOTE — PROGRESS NOTES
Physical Therapy    Facility/Department: [facility] MED SURG  Initial Assessment    NAME: Luis Schultz  : 1975  MRN: [id number]    Date of Service: 2021      Attending Provider:  Norma Salamanca DO    Evaluating PT:  Stephanie Clarke, P.T. Room #:  6419/4807-P  Diagnosis:  Polydipsia [R63.1]  Pertinent PMHx/PSHx:  Brain tumor s/p craniotomy 21  Precautions:  none    SUBJECTIVE:    Pt lives with his fiance in a 1 story home with 3 stairs and 1 rail to enter. Pt ambulated with no AD PTA. OBJECTIVE:   Initial Evaluation  Date: 21   Was pt agreeable to Eval/treatment? yes   Does pt have pain? No c/o pain   Bed Mobility  Rolling: Independent  Supine to sit: Independent  Sit to supine: Independent  Scooting: Independent   Transfers Sit to stand: Independent  Stand to sit: Independent  Stand pivot: Independent   Ambulation   500 feet with no AD Independent    Stair negotiation: ascended and descended NA   AM-PAC 6 Clicks 75/23     BLE ROM is WFL. BLE strength is grossly 5/5. Sensation:  Pt denies numbness and tingling to extremities  Edema:  None noted  Balance: sitting is Independent and standing with no AD is Independent   Endurance: good    ASSESSMENT:    Comments:  Pt is Independent with functional mobility at this time and has no skilled PT needs. Pt's/ family goals   1. To go home. Patient and or family understand(s) diagnosis, prognosis, and plan of care. PHYSICAL THERAPY PLAN OF CARE:    PT will discharge pt from our service at this time. Referring provider/PT Order:  PT eval and treat  Diagnosis:  Polydipsia [R63.1]    Time in  10:10  Time out  10:20    Evaluation Time includes thorough review of current medical information, gathering information on past medical history/social history and prior level of function, completion of standardized testing/informal observation of tasks, assessment of data and education on plan of care and goals.     CPT codes:  [x] Low Complexity PT evaluation 31384  [] Moderate Complexity PT evaluation 62895  [] High Complexity PT evaluation [id number]  [] PT Re-evaluation [id number]  [] Gait training 17083 ** minutes  [] Manual therapy 84591 ** minutes  [] Therapeutic activities 88343 ** minutes  [] Therapeutic exercises 73439 ** minutes  [] Neuromuscular reeducation 78561 ** minutes     Shubham Mohan, P.T.   License Number: [id number]

## 2021-11-17 NOTE — PROGRESS NOTES
[facility] MD Meir Engle, MD Ely Cushing, MD Hamilton Hector, MD Kamran Angela, CNP   Ashanti Edgar, DANY  Progress Note    11/17/2021    SUBJECTIVE:   11/17: Seen this morning. No complaint. Anxious for discharge. Denies (-) sob/oliva/cp/palp Appetite good. PROBLEM LIST:    Principal Problem:    Diabetes insipidus ([facility]  Active Problems:    Cryptogenic stroke (HCC)    Presence of cardiac device    Leg swelling    S/P craniotomy    KACY (acute kidney injury) ([facility]    History of DVT (deep vein thrombosis)    Astrocytoma (HCC)    Polyuria    Polydipsia  Resolved Problems:    * No resolved hospital problems. *         DIET:    ADULT DIET;  Regular     MEDS (scheduled):    sodium chloride flush  10 mL IntraVENous 2 times per day    enoxaparin  120 mg SubCUTAneous Q12H    levETIRAcetam  1,000 mg Oral BID    montelukast  10 mg Oral Nightly    rosuvastatin  5 mg Oral Every Other Day    insulin lispro  0-12 Units SubCUTAneous TID WC    insulin lispro  0-6 Units SubCUTAneous Nightly    desmopressin  10 mcg Nasal BID    glipiZIDE  5 mg Oral BID AC       MEDS (infusions):   sodium chloride      dextrose         MEDS (prn):  sodium chloride flush, sodium chloride, ondansetron **OR** ondansetron, senna, acetaminophen **OR** acetaminophen, glucose, dextrose, glucagon (rDNA), dextrose    PHYSICAL EXAM:     Patient Vitals for the past 24 hrs:   BP Temp Temp src Pulse Resp SpO2 Weight   11/17/21 0815 (!) 151/93 97.9 °F (36.6 °C) Oral 87 16 98 %    11/17/21 0600       287 lb 9.6 oz (130.5 kg)   11/17/21 0013       287 lb (130.2 kg)   11/16/21 2200 (!) 156/97 97.7 °F (36.5 °C) Oral 91 16 98 %    11/16/21 1845 (!) 166/98 97.7 °F (36.5 °C) Oral 84 16 98 %    @      Intake/Output Summary (Last 24 hours) at 11/17/2021 1713  Last data filed at 11/17/2021 1508  Gross per 24 hour   Intake 940 ml   Output 5150 ml   Net -4210 ml         Wt Readings from Last 3 Encounters:   11/17/21 287 lb 9.6 oz (130.5 kg)   11/05/21 285 lb (129.3 kg)   09/10/21 282 lb (127.9 kg)       Constitutional:  in no acute distress  HEENT: NC/AT, EOMI, sclera and conjunctiva are clear and anicteric, mucus membranes moist  Neck: Trachea midline, no JVD  Cardiovascular: S1, S2 regular rhythm, no murmur,or rub  Respiratory:  No crackles, no wheeze  Gastrointestinal:  Soft, nontender, nondistended, NABS  Ext: no edema, feet warm  Skin: dry, no rash  Neuro: awake, alert, interactive      DATA:    Recent Labs     11/16/21  1002   WBC 3.6*   HGB 14.1   HCT 42.4   MCV 86.5        Recent Labs     11/16/21  1002 11/16/21  1951 11/17/21  0432    144 145   K 4.2 4.4 3.7    104 106   CO2 28 30* 29   MG  --   --  2.5   PHOS  --   --  4.6*   BUN 13 14 16   CREATININE 1.8* 1.9* 1.9*   ALT 20  --   --    AST 16  --   --    BILITOT 0.3  --   --    ALKPHOS 56  --   --        No results found for: LABPROT    ASSESSMENT / RECOMMENDATIONS:    1.  Polyuria with polydipsia. His history is convincing for DI, no typically it is not seen with resection of a posterior tumor, but rather craniopharyngioma, and while his polyuria is impressive, it does not seem so impressive to suggest complete diabetes insipidus. The chemotherapy agent temozolamide (temodar) which is being used to treat his astrocytoma because of nephrogenic DI as a side effect. The incomplete nature of his DI is consistent with a medication side effect. 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 Notably also, he was drinking adequate amount of fluid to compensate       2. KACY -- pre-renal azotemia likely, though may consider in the differential dx toxicity from the chemotx, though Temodar is not known to cause KACY as a side effect.   Etiologies of renal azotemia is the UTI itself, and it became worse as he was fluid restricted throughout most of yesterday. Okay for discharge from renal standpoint  Follow-up with oncology at [facility] as planned  We will inform them of the complications/side effects.     Discussed with Dr. Zo Gastelum    Electronically signed by Carmel Smalls MD on 11/17/2021

## 2021-11-17 NOTE — PROGRESS NOTES
CLINICAL PHARMACY NOTE: MEDS TO BEDS    Total # of Prescriptions Filled: 1   The following medications were delivered to the patient:  · Amlodipine 5 mg    Additional Documentation:

## 2021-11-17 NOTE — CONSULTS
[facility] MD Jenniefr Troncoso, MD Richelle Barth MD Loman Rea, TONG Friedman, DANY  Consultation  11/16/2021    Thank you for consult  Full note dictated , to follow  Briefly, 51 y.o. brenda underwent resection of an astrocytoma in his posterior cranium in August, followed by radiation and chemotherapy. For the past 2 weeks he has experienced severe thirst, polyuria and notes that he urinates roughly every 30 to 45 minutes typically 200 to 400 cc, and he has been drinking at least 4000 cc of water per day. He was seen by endocrinology as an outpatient this morning, and referred for admission to the hospital for evaluation by endocrinology. Serum sodium on presentation was 143 mmol/L. Urine osmolality 131 with urine sodium 22, urine chloride < 20    He was made n.p.o. at roughly 1:30 this afternoon. Intranasal desmopressin has been ordered for 9 PM.  No other diagnostic evaluation has been ordered. A/R:  1.  Polyuria with polydipsia. His history is convincing for DI, typically it is not seen with resection of a posterior tumor, but rather craniopharyngioma, and while his polyuria is impressive, it does not seem so impressive to suggest complete diabetes insipidus. One wonders if the chemotherapy agent may have a side effect nephrogenic DI. Urine osmolality is lower than I would expect given serum sodium 143 mmol/L, and likely he is not excreting an appropriately concentrated urine. I would expect a higher volume of urine if he had complete DI than he had today, though that said, urine output was not actually recorded (he is estimating 100-200 cc/h). Now that his effective water deprivation is 3 hours beyond what would be considered standard for a water deprivation test, we may as well check BMP, serum osmolality, urine osmolality, urine sodium, chloride and potassium.   Appropriate next step for distinguishing nephrogenic versus central diabetes insipidus will be to administer the 10 mcg ordered intranasally, then follow his urine osmolality to 30 minutes x 4 tested to effect. Further evaluation to follow as warranted    2.   KACY -- pre-renal azotemia likely, though may consider in the differential dx toxicity from the chemotx (pending learning what it was)      Mamadou Abrams MD, MD

## 2021-11-17 NOTE — CONSULTS
ENDOCRINOLOGY INITIAL CONSULTATION NOTE    Date of Admission: 11/16/2021  Date of Service: 11/17/2021  Admitting Physician: Harry Carroll DO   Primary Care Physician: Ghada Haney MD  Consultant physician: Dusty Smith MD     Reason for the consultation:  Polyuria and polydipsia, concerning for DI     History of Present Illness: The history is provided by the patient. Accuracy of the patient data is excellent. Rebeca Lee is a very pleasant 51 y.o. old male with PMH of astrocytoma s/p resection in 8/2021 followed by chemo and radiation therapy and [facility] who presented [facility] on 11/16/2021 because of worsening polyuria and polydipsia , endocrine service was consulted for evaluation and management of possible diabetes insipidus   Over the past 2-3 weeks the patient started c/o severe thirst, polyuria and urinates large mount of urine almost every half an hr. The patient also feels very thirst and has been drinking at least 4000 cc of water per day. Admission labs Na 143, urine Osm, 131, urine Na 22,      Past Medical History   Past Medical History:   Diagnosis Date    Acute deep vein thrombosis (DVT) of femoral vein of left lower extremity ([address] 9/23/2021    Brain tumor (benign) ([address]     DVT of leg (deep venous thrombosis) ([address]     Hyperlipidemia     Hypertension     Leg swelling 9/23/2021    S/P craniotomy 9/23/2021       Past Surgical History   Past Surgical History:   Procedure Laterality Date    CRANIOTOMY  08/11/2021       Social history   Tobacco:   reports that he has never smoked. He has never used smokeless tobacco.  Alcohol:   reports current alcohol use. Drugs:   reports no history of drug use.   Family history   Family History   Problem Relation Age of Onset    Cancer Father        Allergies and Drug reactions  No Known Allergies    Scheduled Meds:   sodium chloride flush  10 mL IntraVENous 2 times per day    enoxaparin  120 mg SubCUTAneous Q12H    levETIRAcetam  1,000 mg Oral BID  montelukast  10 mg Oral Nightly    rosuvastatin  5 mg Oral Every Other Day    insulin lispro  0-12 Units SubCUTAneous TID WC    insulin lispro  0-6 Units SubCUTAneous Nightly    desmopressin  10 mcg Nasal BID    glipiZIDE  5 mg Oral BID AC     PRN Meds:   sodium chloride flush, 10 mL, PRN  sodium chloride, 25 mL, PRN  ondansetron, 4 mg, Q8H PRN   Or  ondansetron, 4 mg, Q6H PRN  senna, 1 tablet, Daily PRN  acetaminophen, 650 mg, Q6H PRN   Or  acetaminophen, 650 mg, Q6H PRN  glucose, 15 g, PRN  dextrose, 12.5 g, PRN  glucagon (rDNA), 1 mg, PRN  dextrose, 100 mL/hr, PRN      Continuous Infusions:   sodium chloride      dextrose         Review of Systems  All systems reviewed. All negative except for symptoms mentioned in HPI     OBJECTIVE    BP (!) 151/93   Pulse 87   Temp 97.9 °F (36.6 °C) (Oral)   Resp 16   Ht 6' 3\" (1.905 m)   Wt 287 lb 9.6 oz (130.5 kg)   SpO2 98%   BMI 35.95 kg/m²   Wt Readings from Last 6 Encounters:   11/17/21 287 lb 9.6 oz (130.5 kg)   11/05/21 285 lb (129.3 kg)   09/10/21 282 lb (127.9 kg)   09/08/21 292 lb (132.5 kg)   09/02/21 289 lb (131.1 kg)   07/13/21 288 lb (130.6 kg)       Physical examination:  General: awake alert, oriented x3, no abnormal position or movements.    HEENT: normocephalic non traumatic,    Neck: supple   Pulm: good equal air entry no added sounds   CVS: S1 + S2   Abd: soft lax, no tenderness,   Skin: warm, no lesions, no rash   Musculoskeletal: No back tenderness, no kyphosis/scoliosis    Neuro: CN intact, sensation notmal , muscle power normal.    Psych: normal mood, and affect    Review of Laboratory Data:  I personally reviewed the following labs:  Recent Labs     11/16/21  1002   WBC 3.6*   RBC 4.90   HGB 14.1   HCT 42.4   MCV 86.5   MCH 28.8   MCHC 33.3   RDW 14.5      MPV 8.3     Recent Labs     11/16/21  1002 11/16/21 1951 11/17/21  0432    144 145   K 4.2 4.4 3.7    104 106   CO2 28 30* 29   BUN 13 14 16   CREATININE 1.8* 1. 9* 1.9*   GLUCOSE 107* 114* 86   CALCIUM 9.4 9.6 9.1   PROT 7.6  --   --    LABALBU 4.1  --   --    BILITOT 0.3  --   --    ALKPHOS 56  --   --    AST 16  --   --    ALT 20  --   --      No results found for: BHYDRXBUT  Lab Results   Component Value Date    LABA1C 7.4 11/16/2021    LABA1C 7.9 11/05/2021    LABA1C 6.0 02/16/2016     Lab Results   Component Value Date/Time    TSH 1.422 07/26/2013 08:30 AM     Lab Results   Component Value Date    LABA1C 7.4 11/16/2021    GLUCOSE 86 11/17/2021     Lab Results   Component Value Date    TRIG 128 02/16/2016    HDL 43 02/16/2016    LDLCALC 80 02/16/2016    CHOL 149 02/16/2016       Blood culture   Lab Results   Component Value Date    BC 5 Days no growth 09/10/2021    BC 5 Days- no growth 03/29/2016       Radiology:  US RETROPERITONEAL COMPLETE   Final Result   Unremarkable ultrasound of the kidneys. Minimal urinary bladder wall thickening. Prostatic volume 17.41 mL. Medical Records/Labs/Images Review:   I personally reviewed and summarized previous records   All labs and imaging were reviewed independently    [address] Marcial, a 51 y.o.-old male seen in for management of possible DI     Polyuria and polydipsia   · Clinical presentation and significance of polyuria very consistent DI  · Central from brain tumor with surgery and radiation vs nephrogenic from chemotherapy   · I asked pt to not drink anything from 2 pm to 8 pm last night and he continued to urinate large amount of urine   · Will proceed with DDAVP 10 mg intranasal BID   · Will assess response to DDAVP both clinically and by rechecking urine osm after starting DDAVP. This will help us differentiating between central and nephrogenic DI.    · Discussed the importance of drinking only to thirst while on DDAVP     Astrocytoma  · S/p surgical resection, chemo and radiation therapy 8/2021     The above issues were reviewed with the patient who understood and agreed with the plan. Thank you for allowing us to participate in the care of this patient. Please do not hesitate to contact us with any additional questions. Edmundo Larios MD  Endocrinologist, [facility]   [address] [place] [id number]   Phone: [phone]  Fax: [phone]  ---------------------------------  An electronic signature was used to authenticate this note.  Jessenia Guajardo MD on 11/17/2021 at 3:48 PM

## 2021-11-18 ENCOUNTER — HOSPITAL ENCOUNTER (OUTPATIENT)
Age: 46
Discharge: HOME | End: 2021-11-18
Payer: COMMERCIAL

## 2021-11-18 DIAGNOSIS — E23.2 DIABETES INSIPIDUS SECONDARY TO VASOPRESSIN DEFICIENCY (HCC): ICD-10-CM

## 2021-11-18 DIAGNOSIS — E23.2 DIABETES INSIPIDUS (HCC): ICD-10-CM

## 2021-11-18 DIAGNOSIS — R35.89 POLYURIA: Primary | ICD-10-CM

## 2021-11-18 LAB
ANION GAP SERPL CALCULATED.3IONS-SCNC: 9 MMOL/L (ref 7–16)
BUN BLDV-MCNC: 18 MG/DL (ref 6–20)
CALCIUM SERPL-MCNC: 8.9 MG/DL (ref 8.6–10.2)
CHLORIDE BLD-SCNC: 105 MMOL/L (ref 98–107)
CO2: 28 MMOL/L (ref 22–29)
CREAT SERPL-MCNC: 2 MG/DL (ref 0.7–1.2)
GFR AFRICAN AMERICAN: 43
GFR NON-AFRICAN AMERICAN: 43 ML/MIN/1.73
GLUCOSE BLD-MCNC: 157 MG/DL (ref 74–99)
POTASSIUM SERPL-SCNC: 4.1 MMOL/L (ref 3.5–5)
SODIUM BLD-SCNC: 142 MMOL/L (ref 132–146)

## 2021-11-18 PROCEDURE — 36415 COLL VENOUS BLD VENIPUNCTURE: CPT

## 2021-11-18 PROCEDURE — 80048 BASIC METABOLIC PNL TOTAL CA: CPT

## 2021-11-18 RX ORDER — DESMOPRESSIN ACETATE 0.1 MG/ML
1 SOLUTION NASAL 2 TIMES DAILY
Qty: 5 ML | Refills: 3 | Status: SHIPPED
Start: 2021-11-18 | End: 2022-06-08

## 2021-11-18 NOTE — DISCHARGE SUMMARY
The patient was discharged on the same day as history and physical.  Please see history and physical as well as imaging studies, consult and evaluations, and nurse's notes.     Electronically signed by Velasquez Saravia DO on 11/18/2021 at 10:07 AM

## 2021-11-19 ENCOUNTER — HOSPITAL ENCOUNTER (OUTPATIENT)
Age: 46
Discharge: HOME | End: 2021-11-19
Payer: COMMERCIAL

## 2021-11-19 ENCOUNTER — TELEPHONE (OUTPATIENT)
Dept: ENDOCRINOLOGY | Age: 46
End: 2021-11-19

## 2021-11-19 ENCOUNTER — TELEPHONE (OUTPATIENT)
Dept: VASCULAR SURGERY | Age: 46
End: 2021-11-19

## 2021-11-19 DIAGNOSIS — R35.89 POLYURIA: ICD-10-CM

## 2021-11-19 DIAGNOSIS — E23.2 DIABETES INSIPIDUS SECONDARY TO VASOPRESSIN DEFICIENCY (HCC): ICD-10-CM

## 2021-11-19 LAB
ANION GAP SERPL CALCULATED.3IONS-SCNC: 12 MMOL/L (ref 7–16)
BUN BLDV-MCNC: 18 MG/DL (ref 6–20)
CALCIUM SERPL-MCNC: 9.6 MG/DL (ref 8.6–10.2)
CHLORIDE BLD-SCNC: 107 MMOL/L (ref 98–107)
CHLORIDE URINE RANDOM: 24 MMOL/L
CO2: 27 MMOL/L (ref 22–29)
CREAT SERPL-MCNC: 2.1 MG/DL (ref 0.7–1.2)
GFR AFRICAN AMERICAN: 41
GFR NON-AFRICAN AMERICAN: 41 ML/MIN/1.73
GLUCOSE BLD-MCNC: 160 MG/DL (ref 74–99)
OSMOLALITY URINE: 203 MOSM/KG (ref 300–900)
OSMOLALITY: 315 MOSM/KG (ref 285–310)
POTASSIUM SERPL-SCNC: 4.6 MMOL/L (ref 3.5–5)
POTASSIUM, UR: 33.6 MMOL/L
SODIUM BLD-SCNC: 146 MMOL/L (ref 132–146)
SODIUM URINE: 26 MMOL/L
SPECIFIC GRAVITY UA: <=1.005 (ref 1–1.03)

## 2021-11-19 PROCEDURE — 36415 COLL VENOUS BLD VENIPUNCTURE: CPT

## 2021-11-19 PROCEDURE — 84133 ASSAY OF URINE POTASSIUM: CPT

## 2021-11-19 PROCEDURE — 83930 ASSAY OF BLOOD OSMOLALITY: CPT

## 2021-11-19 PROCEDURE — 80048 BASIC METABOLIC PNL TOTAL CA: CPT

## 2021-11-19 PROCEDURE — 82436 ASSAY OF URINE CHLORIDE: CPT

## 2021-11-19 PROCEDURE — 83935 ASSAY OF URINE OSMOLALITY: CPT

## 2021-11-19 PROCEDURE — 84300 ASSAY OF URINE SODIUM: CPT

## 2021-11-19 NOTE — TELEPHONE ENCOUNTER
The pharmacy called with a clarification issue on the nasal spray directions. Is he to use the spray in both nostrils or just one.  Please advise

## 2021-11-19 NOTE — TELEPHONE ENCOUNTER
Patient is asking if he can start oral anticoagulation instead of Lovenox injections as he is getting \"lumps\" on his stomach. Asked him to check with neurosurgeon in [facility] [employer] who put him on Lovenox and he states they asked him to check with vascular.

## 2021-11-21 ENCOUNTER — CLINICAL DOCUMENTATION (OUTPATIENT)
Dept: VASCULAR SURGERY | Age: 46
End: 2021-11-21

## 2021-11-21 NOTE — PROGRESS NOTES
Message left for the patient regarding anticoagulation, stating that, needs to hear from the neurosurgery service if it is okay to start oral anticoagulation like Eliquis, which are difficult to reverse particular in view of his grade 3 astrocytoma, if necessary, may need to set up an appointment with the department of vascular medicine, at the [facility] so that they can coordinate with the department of neurosurgery at the [facility], also will attempt to review the records from the [facility] to see what the latest MRI, CT scan of the brain show on him and then make a decision

## 2021-11-23 ENCOUNTER — TELEPHONE (OUTPATIENT)
Dept: VASCULAR SURGERY | Age: 46
End: 2021-11-23

## 2021-11-23 NOTE — TELEPHONE ENCOUNTER
Notified patient of appointment with [facility] Dept of Vascular Medicine on 12-1-21 at 9:45 am. Danna Fernandez M.D.

## 2021-11-23 NOTE — TELEPHONE ENCOUNTER
Discussed with the patient and his fiancé Ayaz Patron regarding anticoagulation, patient would prefer to have an oral anticoagulation, informed him, drugs like Eliquis etc. could be difficult to reverse once patient starts having bleeding issues    Patient is concerned about little bumps at the site of injections, instructed him, after injection, hold pressure at the site for 3 to 5 minutes    Also reviewed the last CT scan and MRI, suspicion for residual brain tumor, grade 3 astrocytoma, this was discussed with the patient's Brett Rodrigez also    Because of concern on my part, of potential bleeding, intracranially while on anticoagulation, it is felt it is prudent and reasonable to obtain the opinion, with the department of vascular medicine at the [facility] that way they can even communicate with their local neurosurgeon in their facility regarding the optimal anticoagulation therapy    All their questions were answered

## 2021-12-06 ENCOUNTER — TELEPHONE (OUTPATIENT)
Dept: ENDOCRINOLOGY | Age: 46
End: 2021-12-06

## 2021-12-06 NOTE — TELEPHONE ENCOUNTER
Pt thinks that the nasal spray is not working. Pt states he is still urinating frequently. Please advise.      desmopressin (DDAVP NASAL) 0.01 % solution   1 spray twice a day

## 2021-12-07 NOTE — TELEPHONE ENCOUNTER
He needs to call his endocrinologist (Dr. Nigel Bell)    I saw him when he was  in the hospital but he is Dr. Zachariah Marie patient

## 2022-05-04 ENCOUNTER — OFFICE VISIT (OUTPATIENT)
Dept: NEUROSURGERY | Age: 47
End: 2022-05-04
Payer: COMMERCIAL

## 2022-05-04 VITALS
HEIGHT: 75 IN | OXYGEN SATURATION: 97 % | HEART RATE: 82 BPM | DIASTOLIC BLOOD PRESSURE: 95 MMHG | SYSTOLIC BLOOD PRESSURE: 154 MMHG | RESPIRATION RATE: 20 BRPM | TEMPERATURE: 98.2 F | BODY MASS INDEX: 35.68 KG/M2 | WEIGHT: 287 LBS

## 2022-05-04 DIAGNOSIS — M51.36 LUMBAR DEGENERATIVE DISC DISEASE: ICD-10-CM

## 2022-05-04 DIAGNOSIS — M54.50 ACUTE BILATERAL LOW BACK PAIN WITHOUT SCIATICA: Primary | ICD-10-CM

## 2022-05-04 PROCEDURE — G8427 DOCREV CUR MEDS BY ELIG CLIN: HCPCS | Performed by: STUDENT IN AN ORGANIZED HEALTH CARE EDUCATION/TRAINING PROGRAM

## 2022-05-04 PROCEDURE — G8417 CALC BMI ABV UP PARAM F/U: HCPCS | Performed by: STUDENT IN AN ORGANIZED HEALTH CARE EDUCATION/TRAINING PROGRAM

## 2022-05-04 PROCEDURE — 1036F TOBACCO NON-USER: CPT | Performed by: STUDENT IN AN ORGANIZED HEALTH CARE EDUCATION/TRAINING PROGRAM

## 2022-05-04 PROCEDURE — 99203 OFFICE O/P NEW LOW 30 MIN: CPT | Performed by: STUDENT IN AN ORGANIZED HEALTH CARE EDUCATION/TRAINING PROGRAM

## 2022-05-04 RX ORDER — TEMOZOLOMIDE 250 MG/1
CAPSULE ORAL
COMMUNITY
Start: 2022-03-21

## 2022-05-04 RX ORDER — APIXABAN 5 MG/1
TABLET, FILM COATED ORAL
COMMUNITY
Start: 2022-04-28

## 2022-05-04 RX ORDER — ALPRAZOLAM 1 MG/1
TABLET ORAL
COMMUNITY
Start: 2022-04-13 | End: 2022-06-08

## 2022-05-04 RX ORDER — AMLODIPINE BESYLATE 10 MG/1
TABLET ORAL
COMMUNITY
Start: 2022-05-01

## 2022-05-04 RX ORDER — AZITHROMYCIN 250 MG/1
TABLET, FILM COATED ORAL
COMMUNITY
Start: 2022-04-21 | End: 2022-06-08 | Stop reason: ALTCHOICE

## 2022-05-04 RX ORDER — ONDANSETRON HYDROCHLORIDE 8 MG/1
TABLET, FILM COATED ORAL
COMMUNITY
Start: 2022-01-14

## 2022-05-04 RX ORDER — PANTOPRAZOLE SODIUM 40 MG/1
TABLET, DELAYED RELEASE ORAL
COMMUNITY
Start: 2022-04-28

## 2022-05-04 RX ORDER — TOPIRAMATE 25 MG/1
TABLET ORAL DAILY
COMMUNITY
Start: 2021-09-08

## 2022-05-04 ASSESSMENT — ENCOUNTER SYMPTOMS
SHORTNESS OF BREATH: 0
BACK PAIN: 1
ABDOMINAL PAIN: 0
PHOTOPHOBIA: 0
TROUBLE SWALLOWING: 0

## 2022-05-04 NOTE — PROGRESS NOTES
Subjective:      Patient ID: Beronica Mejias is a 49 y.o. male with a PMH of Astrocytoma s/p craniotomy by Dr. Antonio Lopez at [facility] in August 2021 who presents for evaluation of low back pain that has lasted about 3 months. He denies any known trauma to the back. Patient is currently in chemotherapy for his Astrocytoma and states he has 7 more cycles to go. He describes this pain as a sharp,stabbing pain that radiates across the back. No radiation down the legs. States he does have associated weakness in his legs to the point where he feels like he is going to fall down. Denies any associated numbness. He is currently in PT at [facility] which is providing no relief. Has not tried [facility] for this pain. No bowel or bladder incontinence. He is a nonsmoker and is currently on Elliquis daily. MRI from [facility] reviewed with patient. Review of Systems   Constitutional: Negative for fever. HENT: Negative for trouble swallowing. Eyes: Negative for photophobia. Respiratory: Negative for shortness of breath. Cardiovascular: Negative for chest pain. Gastrointestinal: Negative for abdominal pain. Endocrine: Negative for heat intolerance. Genitourinary: Negative for flank pain. Musculoskeletal: Positive for back pain. Negative for myalgias. Skin: Negative for wound. Neurological: Positive for weakness. Negative for numbness and headaches. Psychiatric/Behavioral: Negative for confusion. Objective:   Physical Exam  HENT:      Head: Normocephalic. Eyes:      Pupils: Pupils are equal, round, and reactive to light. Cardiovascular:      Rate and Rhythm: Normal rate. Pulmonary:      Effort: Pulmonary effort is normal.   Abdominal:      General: There is no distension. Musculoskeletal:         General: Normal range of motion. Cervical back: Normal range of motion. Skin:     General: Skin is warm and dry. Neurological:      Mental Status: He is alert.       Comments: A&Ox3  CN3-12 intact  Motor Strength full   Sensation intact to light touch   Reflexes normal   (-)Bilateral Straight leg test  Mild Tenderness to palpation of lumbar    Psychiatric:         Thought Content: Thought content normal.       [id number] MRI [facility]  L5-S1:    Disc osteophyte complex with foraminal extension and facet  degeneration. There is slight effacement of the thecal sac and severe  foraminal stenosis with contact of the L5 nerve roots. Disc and endplate  signal changes possibly degenerative. Discitis osteomyelitis is not  entirely excluded in the appropriate clinical setting. Correlation with ESR  and CRP levels recommended. Additional mild spondylosis and stenosis as detailed. Component of congenital spinal canal stenosis. Assessment:      -Jaocb Love is a 48 y/o male who presents with low back pain. No radiation of pain into the legs. Is current in PT with little help. Has not tried MELVINA. MRI as about. Patient currently in chemo for astrocytoma that was removed in 2021. Plan:      -Pain control and expectations discussed  -Pain clinic referral given  -Continue with conservative therapy while in chemo.   -Follow up in clinic as needed  -Please call with any questions or concerns.          Deneen Rawls PA-C

## 2022-05-08 ENCOUNTER — HOSPITAL ENCOUNTER (EMERGENCY)
Age: 47
Discharge: HOME | End: 2022-05-08
Payer: COMMERCIAL

## 2022-05-08 ENCOUNTER — APPOINTMENT (OUTPATIENT)
Dept: CT IMAGING | Age: 47
End: 2022-05-08
Payer: COMMERCIAL

## 2022-05-08 VITALS
SYSTOLIC BLOOD PRESSURE: 172 MMHG | TEMPERATURE: 99.1 F | WEIGHT: 287 LBS | HEART RATE: 89 BPM | DIASTOLIC BLOOD PRESSURE: 99 MMHG | RESPIRATION RATE: 16 BRPM | OXYGEN SATURATION: 98 % | BODY MASS INDEX: 35.68 KG/M2 | HEIGHT: 75 IN
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
## 2022-05-08 DIAGNOSIS — S09.90XA INJURY OF HEAD, INITIAL ENCOUNTER: ICD-10-CM

## 2022-05-08 PROCEDURE — 99284 EMERGENCY DEPT VISIT MOD MDM: CPT

## 2022-05-08 PROCEDURE — 70450 CT HEAD/BRAIN W/O DYE: CPT

## 2022-05-08 ASSESSMENT — PAIN - FUNCTIONAL ASSESSMENT: PAIN_FUNCTIONAL_ASSESSMENT: NONE - DENIES PAIN
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
## 2022-06-06 NOTE — PROGRESS NOTES
[address] ELECTROPHYSIOLOGY DEPARTMENT/DIVISION OF CARDIOLOGY  Outpatient Consultation Report  PATIENT: Leandra Saab  MEDICAL RECORD NUMBER: [id number]  DATE OF SERVICE:  6/6/2022  ATTENDING ELECTROPHYSIOLOGIST:  Jessica Hudson D.O.  REFERRING PHYSICIAN: Jordin Rivera MD and Kushal Willard MD  CHIEF COMPLAINT: dizziness    HPI: Leandra Saab is a 49 y.o. 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). His medications include amlodipine 10 mg daily, apixaban 5 mg BID, HCTZ 12.5 mg q M/W/F, rosuvastatin 5 mg QOD, and PPI. In 2/2015, Dr Lam Metcalf implanted a Medtronic LINQ due to suspected cryptogenic stroke. 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 He presents today, 6/8/22; as a referral to my office for removal of ILR. He denies any other complaints at this time. Prior cardiac testing:   · FLORIDALMA: 2/17/2016: LVEF = \"normal\", mild LAE, no LA/CECI thrombus, and no shunt. Past Medical History:   Diagnosis Date    Acute deep vein thrombosis (DVT) of femoral vein of left lower extremity ([address] 9/23/2021    Brain tumor (benign) ([address]     DVT of leg (deep venous thrombosis) ([address]     Hyperlipidemia     Hypertension     Leg swelling 9/23/2021    S/P craniotomy 9/23/2021     Past Surgical History:   Procedure Laterality Date    CRANIOTOMY  08/11/2021      Family History   Problem Relation Age of Onset    Cancer Father      There is no family history of sudden cardiac arrest    Social History     Tobacco Use    Smoking status: Never Smoker    Smokeless tobacco: Never Used   Substance Use Topics    Alcohol use:  Yes     Alcohol/week: 0.0 standard drinks     Comment: rarely       Current Outpatient Medications   Medication Sig Dispense Refill    ALPRAZolam (XANAX) 1 MG tablet TAKE 1 TABLET BY MOUTH 1 HOUR BEFORE MRI      amLODIPine (NORVASC) 10 MG tablet TAKE 1 TABLET BY MOUTH ONCE DAILY      ELIQUIS 5 MG TABS tablet TAKE 1 TABLET BY MOUTH EVERY 12 HOURS      azithromycin (ZITHROMAX) 250 MG tablet TAKE 2 TABLETS BY MOUTH ON DAY 1, AND THEN TAKE 1 TABLET BY MOUTH ONCE A DAY ON DAY 2 THROUGH DAY 5      ondansetron (ZOFRAN) 8 MG tablet Take 1 tablet (8 mg) by mouth one hour prior to chemotherapy, may also take every 8 hours PRN for nausea      pantoprazole (PROTONIX) 40 MG tablet TAKE 1 TABLET BY MOUTH ONCE DAILY      temozolomide (TEMODAR) 250 MG chemo capsule       topiramate (TOPAMAX) 25 MG tablet Take by mouth      desmopressin (DDAVP NASAL) 0.01 % solution 1 spray by Nasal route 2 times daily 5 mL 3    glipiZIDE (GLUCOTROL) 5 MG tablet Take 5 mg by mouth 2 times daily (before meals)      enoxaparin (LOVENOX) 120 MG/0.8ML injection Inject 120 mg into the skin every 12 hours      acetaminophen (TYLENOL) 500 MG tablet Take 500 mg by mouth every 6 hours as needed for Pain      Elastic Bandages & Supports (JOBST KNEE HIGH COMPRESSION ) MISC Knee high with 20- 30 mmhg of compression 1 each 10    levETIRAcetam (KEPPRA) 500 MG tablet Take 1,000 mg by mouth 2 times daily Pt is currently being weaned off keppra      rosuvastatin (CRESTOR) 5 MG tablet Take 5 mg by mouth every other day       montelukast (SINGULAIR) 10 MG tablet Take 10 mg by mouth nightly        No current facility-administered medications for this visit. No Known Allergies    ROS:   Constitutional: Negative for fever, activity change and appetite change. HENT: Negative for epistaxis. Eyes: Negative for diploplia, blurred vision. Respiratory: Negative for cough, chest tightness, shortness of breath and wheezing. Cardiovascular: pertinent positives in HPI  Gastrointestinal: Negative for abdominal pain and blood in stool. Genitourinary: Negative for hematuria and difficulty urinating. Musculoskeletal: Negative for myalgias and gait problem. Skin: Negative for color change and rash. Neurological: Negative for syncope and light-headedness. Psychiatric/Behavioral: Negative for confusion and agitation. The patient is not nervous/anxious. Heme: no bleeding disorders, no melena or hematochezia  All other review of systems are negative     PHYSICAL EXAM:  /88 (Site: Left Upper Arm, Position: Standing, Cuff Size: Large Adult)   Pulse 78   Resp 18   Ht 6' 3\" (1.905 m)   Wt 297 lb 6.4 oz (134.9 kg)   SpO2 98%   BMI 37.17 kg/m²    Constitutional: Well-developed, no acute distress, well groomed, obese  Eyes: conjunctivae normal, no xanthelasma   Ears, Nose, Throat: oral mucosa moist, no cyanosis   Neck: supple, no JVD, no bruits, no thyromegaly   CV: normal rate, regular rhythm,  no murmurs, rubs, or gallops. PMI is nondisplaced, Peripheral pulses normal including bilateral radial and pedal pulses are normal in quality  Lungs: clear to auscultation bilaterally, normal respiratory effort without used of accessory muscles, no wheezes  Abdomen: soft, non-tender, bowel sounds present, no masses or hepatomegaly   Extremities: no digital clubbing, no edema   Skin: warm, no rashes   Neuro/Psych: A&O x 3, normal mood and affect  CIED site: C/D/I, no erythema, edema, or drainage    Cardiac testing done today:   · EC22: SR at 70 bpm, QTc = 379 msec    Assessment/Plan:  1. Medtronic LINQ  -Device is at EOL and no longer able to interrogate.  -I reviewed options of leaving in place or removing, including indications, material risks, or benefits of each. Patient desires to remove. -Follow-up with device clinic 2 weeks after removal.  -Follow-up with my office as needed. I spent a total of 60 minutes reviewing previous notes, test results, and face to face with the patient discussing the diagnosis and importance of compliance with the treatment plan as well as documenting on the day of the visit.      Time of the day of service includes:  · Preparing to see the patient (eg. Review of the medical record, such as tests). · Obtaining and/or reviewing separately obtained history. · Ordering prescription medications, tests, and/or procedures. · Communicating results to the patient/family/caregiver. · Counseling/educating the patient/family/caregiver. · Documenting clinical information in the patients electronic record. · Coordination of care for the patient. · Performing a medical appropriate exam and/or evaluation. Thank you for allowing me to participate in your patient's care. Please call me if there are any questions. Juan J Sher D.O.   Cardiac Electrophysiology  [address] [place] [address]

## 2022-06-08 ENCOUNTER — OFFICE VISIT (OUTPATIENT)
Dept: NON INVASIVE DIAGNOSTICS | Age: 47
End: 2022-06-08
Payer: COMMERCIAL

## 2022-06-08 VITALS
HEART RATE: 78 BPM | SYSTOLIC BLOOD PRESSURE: 134 MMHG | DIASTOLIC BLOOD PRESSURE: 88 MMHG | WEIGHT: 297.4 LBS | OXYGEN SATURATION: 98 % | RESPIRATION RATE: 18 BRPM | BODY MASS INDEX: 36.98 KG/M2 | HEIGHT: 75 IN

## 2022-06-08 DIAGNOSIS — I63.9 CRYPTOGENIC STROKE (HCC): ICD-10-CM

## 2022-06-08 DIAGNOSIS — R42 DIZZINESS: Primary | ICD-10-CM

## 2022-06-08 PROCEDURE — 93000 ELECTROCARDIOGRAM COMPLETE: CPT | Performed by: STUDENT IN AN ORGANIZED HEALTH CARE EDUCATION/TRAINING PROGRAM

## 2022-06-08 PROCEDURE — 99205 OFFICE O/P NEW HI 60 MIN: CPT | Performed by: STUDENT IN AN ORGANIZED HEALTH CARE EDUCATION/TRAINING PROGRAM

## 2022-06-08 RX ORDER — HYDROCORTISONE 25 MG/G
CREAM TOPICAL
COMMUNITY
Start: 2022-05-16

## 2022-06-08 RX ORDER — HYDROCHLOROTHIAZIDE 12.5 MG/1
CAPSULE, GELATIN COATED ORAL
COMMUNITY
Start: 2022-05-16

## 2022-06-08 RX ORDER — SILDENAFIL 100 MG/1
100 TABLET, FILM COATED ORAL PRN
COMMUNITY
Start: 2022-06-03

## 2022-06-08 NOTE — PATIENT INSTRUCTIONS
No medication changes at this time. You will be scheduled to remove cardiac monitor. Follow-up 2 weeks after removal of monitor for incision check.

## 2022-06-14 ENCOUNTER — TELEPHONE (OUTPATIENT)
Dept: VASCULAR SURGERY | Age: 47
End: 2022-06-14

## 2022-06-14 NOTE — TELEPHONE ENCOUNTER
Pt was re-referred to Dr. Bill Fay by Dr. Esthela Samson; note faxed to Dr. Rachell Prince office instructing to have the pt continue following at [facility] as Dr. Bill Fay referred him there.

## 2022-06-27 ENCOUNTER — TELEPHONE (OUTPATIENT)
Dept: CARDIAC CATH/INVASIVE PROCEDURES | Age: 47
End: 2022-06-27

## 2022-06-28 ENCOUNTER — ANESTHESIA (OUTPATIENT)
Dept: CARDIAC CATH/INVASIVE PROCEDURES | Age: 47
End: 2022-06-28

## 2022-06-28 ENCOUNTER — HOSPITAL ENCOUNTER (OUTPATIENT)
Dept: CARDIAC CATH/INVASIVE PROCEDURES | Age: 47
Discharge: HOME | End: 2022-06-28
Payer: COMMERCIAL

## 2022-06-28 ENCOUNTER — ANESTHESIA EVENT (OUTPATIENT)
Dept: CARDIAC CATH/INVASIVE PROCEDURES | Age: 47
End: 2022-06-28

## 2022-06-28 VITALS
HEIGHT: 75 IN | BODY MASS INDEX: 36.06 KG/M2 | HEART RATE: 82 BPM | DIASTOLIC BLOOD PRESSURE: 70 MMHG | SYSTOLIC BLOOD PRESSURE: 140 MMHG | WEIGHT: 290 LBS | TEMPERATURE: 98.2 F | RESPIRATION RATE: 20 BRPM

## 2022-06-28 PROCEDURE — 2500000003 HC RX 250 WO HCPCS

## 2022-06-28 PROCEDURE — 33286 RMVL SUBQ CAR RHYTHM MNTR: CPT

## 2022-06-28 PROCEDURE — 33286 RMVL SUBQ CAR RHYTHM MNTR: CPT | Performed by: STUDENT IN AN ORGANIZED HEALTH CARE EDUCATION/TRAINING PROGRAM

## 2022-06-28 PROCEDURE — 2580000003 HC RX 258

## 2022-06-28 PROCEDURE — 6360000002 HC RX W HCPCS

## 2022-06-28 PROCEDURE — 2709999900 HC NON-CHARGEABLE SUPPLY

## 2022-06-29 ENCOUNTER — OFFICE VISIT (OUTPATIENT)
Dept: ENT CLINIC | Age: 47
End: 2022-06-29
Payer: COMMERCIAL

## 2022-06-29 VITALS
HEART RATE: 83 BPM | SYSTOLIC BLOOD PRESSURE: 129 MMHG | WEIGHT: 297.8 LBS | HEIGHT: 75 IN | BODY MASS INDEX: 37.03 KG/M2 | DIASTOLIC BLOOD PRESSURE: 89 MMHG

## 2022-06-29 DIAGNOSIS — H61.23 BILATERAL IMPACTED CERUMEN: Primary | ICD-10-CM

## 2022-06-29 PROCEDURE — 69210 REMOVE IMPACTED EAR WAX UNI: CPT | Performed by: OTOLARYNGOLOGY

## 2022-06-29 PROCEDURE — 99204 OFFICE O/P NEW MOD 45 MIN: CPT | Performed by: OTOLARYNGOLOGY

## 2022-06-29 PROCEDURE — G8417 CALC BMI ABV UP PARAM F/U: HCPCS | Performed by: OTOLARYNGOLOGY

## 2022-06-29 PROCEDURE — G8428 CUR MEDS NOT DOCUMENT: HCPCS | Performed by: OTOLARYNGOLOGY

## 2022-06-29 PROCEDURE — 1036F TOBACCO NON-USER: CPT | Performed by: OTOLARYNGOLOGY

## 2022-06-29 RX ORDER — LOSARTAN POTASSIUM 25 MG/1
1 TABLET ORAL DAILY
COMMUNITY
Start: 2022-06-28

## 2022-06-29 RX ORDER — M-VIT,TX,IRON,MINS/CALC/FOLIC 27MG-0.4MG
1 TABLET ORAL DAILY
COMMUNITY

## 2022-06-29 ASSESSMENT — VISUAL ACUITY: OU: 1

## 2022-06-29 ASSESSMENT — ENCOUNTER SYMPTOMS
GASTROINTESTINAL NEGATIVE: 1
TROUBLE SWALLOWING: 0
WHEEZING: 0
STRIDOR: 0
RHINORRHEA: 0
SINUS PAIN: 0
COUGH: 0
CHOKING: 0
SINUS PRESSURE: 0
SORE THROAT: 0
VOICE CHANGE: 0
COLOR CHANGE: 0

## 2022-06-29 NOTE — PROGRESS NOTES
[facility] Otolaryngology  Dr. John Moran D.O. Ms.Ed. New Consult       Patient Name:  Kirti Green  :  1975     CHIEF C/O:    Chief Complaint   Patient presents with    Ear Problem     both ears plugged       HISTORY OBTAINED FROM:  patient    HISTORY OF PRESENT ILLNESS:       Rikki Cuellar is a 49y.o. year old male, here today for cerumen impaction removal. There have been no issues. No drainage. Denies hearing loss.       Past Medical History:   Diagnosis Date    Acute deep vein thrombosis (DVT) of femoral vein of left lower extremity ([address] 2021    Brain tumor (benign) (HCC)     DVT of leg (deep venous thrombosis) ([facility]     Hyperlipidemia     Hypertension     Leg swelling 2021    S/P craniotomy 2021     Past Surgical History:   Procedure Laterality Date    CRANIOTOMY  2021       Current Outpatient Medications:     losartan (COZAAR) 25 MG tablet, Take 1 tablet by mouth daily, Disp: , Rfl:     Multiple Vitamins-Minerals (THERAPEUTIC MULTIVITAMIN-MINERALS) tablet, Take 1 tablet by mouth daily, Disp: , Rfl:     hydroCHLOROthiazide (MICROZIDE) 12.5 MG capsule, TAKE 1 CAPSULE BY MOUTH ON MONDAY, WEDNESDAY AND FRIDAY, Disp: , Rfl:     ELIQUIS 5 MG TABS tablet, TAKE 1 TABLET BY MOUTH EVERY 12 HOURS, Disp: , Rfl:     pantoprazole (PROTONIX) 40 MG tablet, TAKE 1 TABLET BY MOUTH ONCE DAILY, Disp: , Rfl:     topiramate (TOPAMAX) 25 MG tablet, Take by mouth daily , Disp: , Rfl:     glipiZIDE (GLUCOTROL) 5 MG tablet, Take 5 mg by mouth daily , Disp: , Rfl:     rosuvastatin (CRESTOR) 5 MG tablet, Take 5 mg by mouth every other day , Disp: , Rfl:     montelukast (SINGULAIR) 10 MG tablet, Take 10 mg by mouth nightly , Disp: , Rfl:     PROCTO-MED HC 2.5 % CREA rectal cream, APPLY TOPICALLY TO AFFECTED AREA TWICE DAILY, Disp: , Rfl:     sildenafil (VIAGRA) 100 MG tablet, Take 100 mg by mouth as needed , Disp: , Rfl:     amLODIPine (NORVASC) 10 MG tablet, TAKE 1 TABLET BY MOUTH ONCE DAILY, Disp: , Rfl: canal and external ear normal. No drainage. There is impacted cerumen. Nose: Nose normal. No nasal deformity or septal deviation. Mouth/Throat:      Mouth: Mucous membranes are moist.   Eyes:      General: Lids are normal. Vision grossly intact. Extraocular Movements: Extraocular movements intact. Conjunctiva/sclera: Conjunctivae normal.      Pupils: Pupils are equal, round, and reactive to light. Cardiovascular:      Rate and Rhythm: Normal rate. Pulmonary:      Effort: Pulmonary effort is normal.   Musculoskeletal:         General: Normal range of motion. Cervical back: Normal range of motion. Lymphadenopathy:      Cervical: No cervical adenopathy. Skin:     Capillary Refill: Capillary refill takes less than 2 seconds. Neurological:      Mental Status: He is alert. Psychiatric:         Mood and Affect: Mood normal.         Procedure: Cerumen Removal    The patient was verbally consented. A microscope and speculum were used to visualize the external auditory canal. Cerumen was gently removed using suction, curette bilaterally. Tympanic membranes are intact following the procedure. Patient tolerated the procedure well. IMPRESSION/PLAN:    Patient seen and examined with cerumen impaction. Removed today. Tolerated well. Follow up in 1 year. Dr. Marysol Sanabria.  Otolaryngology Facial Plastic Surgery  :[facility] Otolaryngology/Facial Plastic Surgery Residency  Associate Clinical Professor:  Parker Fan, [address] [place] [address]  1975      I have discussed the case, including pertinent history and exam findings with the resident. I have seen and examined the patient and the key elements of the encounter have been performed by me. I agree with the assessment, plan and orders as documented by the resident. Patient here for follow up of medical problems.          Remainder of medical problems as per resident

## 2022-06-29 NOTE — OP NOTE
Operative Note      Patient: Leanrda Saab  YOB: 1975  MRN: [id number]     Date of Procedure: 6/28/2022      Patient History: Leandra Saab is a 49 y.o. 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 His medications include amlodipine 10 mg daily, apixaban 5 mg BID, HCTZ 12.5 mg q M/W/F, rosuvastatin 5 mg QOD, and PPI. In 2/2015, Dr Lam Metcalf implanted a Medtronic LINQ due to suspected cryptogenic stroke. 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 In 6/2022, he was referred to my office and ILR noted to have depleted battery, so interrogation was unable to be performed. He desired removal of ILR. He presents today, 6/28/2022; for removal of ILR. He denies any other complaints at this time. Pre-Op Diagnosis: ILR battery depletion     Post-Op Diagnosis: Same     Surgery: removal of ILR (CPT code: 32361)     Surgeon: Sheron Yi DO     Assistant: None     Anesthesia: local     Estimated Blood Loss (mL): < 5 mL     Complications: none     Detailed Description of Procedure:   Verbal and written consent was obtained from the patient and/or family. Patient was brought to the EP lab in a fasting state. The chest was cleansed with chlorhexidine gluconate and draped in a sterile fashion. Local anesthesia with 2% lidocaine HCl was applied to the planned incision site. An incision was made using a scalpel. A combination of electrocautery and manual dissection were used to locate the previously implanted cardiac rhythm monitor, which was then removed. Antibiotic infused saline was used to perform a pocket wash. Hemostasis was confirmed. The incision was closed with 4-0 Monocryl suture, Dermabond surgical glue, and Aquacel dressing. Summary: Successful removal of implanted cardiac monitor in the setting of device battery depletion and prior misdiagnosis of brain mass and cryoptogenic stroke.     Electronically
bones(Osteoporosis,prev fx,steroid use,metastatic bone ca)

## 2022-07-06 ENCOUNTER — TELEPHONE (OUTPATIENT)
Dept: CARDIAC CATH/INVASIVE PROCEDURES | Age: 47
End: 2022-07-06

## 2022-07-06 NOTE — TELEPHONE ENCOUNTER
I called Mr. Baker to see how he's doing since his Linq explant on 6/28/22. He states he's doing well. Denies any bleeding, drainage, or swelling from insertion site. Opsite dsg was removed yesterday and skin glue remains intact. He's aware of his follow up appt @ the [facility] on 7/12/22 at 1:30 pm.  He offers no complaints and is thankful for the call.

## 2023-07-07 ENCOUNTER — OFFICE VISIT (OUTPATIENT)
Dept: ENT CLINIC | Age: 48
End: 2023-07-07
Payer: COMMERCIAL

## 2023-07-07 VITALS
BODY MASS INDEX: 37.44 KG/M2 | SYSTOLIC BLOOD PRESSURE: 133 MMHG | WEIGHT: 301.1 LBS | HEART RATE: 76 BPM | DIASTOLIC BLOOD PRESSURE: 92 MMHG | HEIGHT: 75 IN

## 2023-07-07 DIAGNOSIS — H61.23 BILATERAL IMPACTED CERUMEN: Primary | ICD-10-CM

## 2023-07-07 PROCEDURE — 69210 REMOVE IMPACTED EAR WAX UNI: CPT | Performed by: OTOLARYNGOLOGY

## 2023-07-07 ASSESSMENT — ENCOUNTER SYMPTOMS
COUGH: 0
VOMITING: 0
TROUBLE SWALLOWING: 0
SHORTNESS OF BREATH: 0
SORE THROAT: 0
RHINORRHEA: 0
VOICE CHANGE: 0

## 2023-07-07 NOTE — PROGRESS NOTES
[facility] Otolaryngology  Dr. Yuliana Deleon Ms.Ed        Patient Name:  Deneen Franz  :  1975     CHIEF C/O:    Chief Complaint   Patient presents with    Follow-up     1 year cerumen patient states right ear clogs up frequently       HISTORY OBTAINED FROM:  patient    HISTORY OF PRESENT ILLNESS:       Chantelle Lane is a 51y.o. year old male, here today for follow up of:       Here for routine cerumen impaction removal. No new complaints or concerns          Past Medical History:   Diagnosis Date    Acute deep vein thrombosis (DVT) of femoral vein of left lower extremity ([address]) 2021    Brain tumor (benign) ([address])     DVT of leg (deep venous thrombosis) ([address])     Hyperlipidemia     Hypertension     Leg swelling 2021    S/P craniotomy 2021     Past Surgical History:   Procedure Laterality Date    CRANIOTOMY  2021    INSERTABLE CARDIAC MONITOR  2022    Loop Explant by Dr. Carolina Bashir       Current Outpatient Medications:     losartan (COZAAR) 25 MG tablet, Take 1 tablet by mouth daily, Disp: , Rfl:     Multiple Vitamins-Minerals (THERAPEUTIC MULTIVITAMIN-MINERALS) tablet, Take 1 tablet by mouth daily, Disp: , Rfl:     PROCTO-MED HC 2.5 % CREA rectal cream, APPLY TOPICALLY TO AFFECTED AREA TWICE DAILY, Disp: , Rfl:     sildenafil (VIAGRA) 100 MG tablet, Take 1 tablet by mouth as needed, Disp: , Rfl:     amLODIPine (NORVASC) 10 MG tablet, TAKE 1 TABLET BY MOUTH ONCE DAILY, Disp: , Rfl:     ELIQUIS 5 MG TABS tablet, TAKE 1 TABLET BY MOUTH EVERY 12 HOURS, Disp: , Rfl:     pantoprazole (PROTONIX) 40 MG tablet, TAKE 1 TABLET BY MOUTH ONCE DAILY, Disp: , Rfl:     temozolomide (TEMODAR) 250 MG chemo capsule, 5 days out of the month, Disp: , Rfl:     topiramate (TOPAMAX) 25 MG tablet, Take by mouth daily , Disp: , Rfl:     glipiZIDE (GLUCOTROL) 5 MG tablet, Take 1 tablet by mouth daily, Disp: , Rfl:     Elastic Bandages & Supports (JOBST KNEE HIGH COMPRESSION SM) MISC, Knee high with 20- 30 mmhg

## 2023-07-13 ENCOUNTER — TELEPHONE (OUTPATIENT)
Dept: ADMINISTRATIVE | Age: 48
End: 2023-07-13

## 2023-09-08 ENCOUNTER — HOSPITAL ENCOUNTER (EMERGENCY)
Age: 48
Discharge: HOME | End: 2023-09-08
Attending: STUDENT IN AN ORGANIZED HEALTH CARE EDUCATION/TRAINING PROGRAM
Payer: COMMERCIAL

## 2023-09-08 ENCOUNTER — APPOINTMENT (OUTPATIENT)
Dept: GENERAL RADIOLOGY | Age: 48
End: 2023-09-08
Payer: COMMERCIAL

## 2023-09-08 VITALS
DIASTOLIC BLOOD PRESSURE: 90 MMHG | TEMPERATURE: 97.9 F | WEIGHT: 295 LBS | HEIGHT: 75 IN | OXYGEN SATURATION: 100 % | SYSTOLIC BLOOD PRESSURE: 148 MMHG | HEART RATE: 78 BPM | BODY MASS INDEX: 36.68 KG/M2 | RESPIRATION RATE: 14 BRPM

## 2023-09-08 DIAGNOSIS — M79.5 RETAINED FOREIGN BODY IN SOFT TISSUE: Primary | ICD-10-CM

## 2023-09-08 PROCEDURE — 99283 EMERGENCY DEPT VISIT LOW MDM: CPT

## 2023-09-08 PROCEDURE — 73030 X-RAY EXAM OF SHOULDER: CPT

## 2023-09-08 ASSESSMENT — PAIN - FUNCTIONAL ASSESSMENT: PAIN_FUNCTIONAL_ASSESSMENT: NONE - DENIES PAIN

## 2023-09-08 NOTE — DISCHARGE INSTRUCTIONS
Follow-up with general surgery for foreign body removal.  Return for any significant worsening symptoms or other acute symptoms or concerns

## 2024-01-26 ENCOUNTER — TELEPHONE (OUTPATIENT)
Dept: CARDIOLOGY CLINIC | Age: 49
End: 2024-01-26

## 2024-01-26 NOTE — TELEPHONE ENCOUNTER
Patient needs a EKG once a week for a total of 4 weeks. Patient is on a new chemo drug , CCF. Please Advise

## 2024-02-12 NOTE — TELEPHONE ENCOUNTER
Patient notified and tried to schedule for EKG. Patient states his chemo has been put off for 3 months will call office once he scheduled to start chemo.

## 2024-07-12 ENCOUNTER — APPOINTMENT (OUTPATIENT)
Dept: ORTHOPEDIC SURGERY | Facility: HOSPITAL | Age: 49
End: 2024-07-12
Payer: MEDICARE

## 2024-07-23 ENCOUNTER — HOSPITAL ENCOUNTER (OUTPATIENT)
Dept: RADIOLOGY | Facility: HOSPITAL | Age: 49
End: 2024-07-23
Payer: MEDICARE

## 2024-07-23 ENCOUNTER — APPOINTMENT (OUTPATIENT)
Dept: RADIOLOGY | Facility: HOSPITAL | Age: 49
End: 2024-07-23
Payer: MEDICARE

## 2025-03-01 ENCOUNTER — HOSPITAL ENCOUNTER (EMERGENCY)
Age: 50
Discharge: HOME | End: 2025-03-01
Attending: STUDENT IN AN ORGANIZED HEALTH CARE EDUCATION/TRAINING PROGRAM
Payer: MEDICARE

## 2025-03-01 ENCOUNTER — APPOINTMENT (OUTPATIENT)
Dept: GENERAL RADIOLOGY | Age: 50
End: 2025-03-01
Payer: MEDICARE

## 2025-03-01 VITALS
HEIGHT: 76 IN | SYSTOLIC BLOOD PRESSURE: 118 MMHG | OXYGEN SATURATION: 95 % | RESPIRATION RATE: 16 BRPM | DIASTOLIC BLOOD PRESSURE: 79 MMHG | HEART RATE: 84 BPM | BODY MASS INDEX: 34.7 KG/M2 | TEMPERATURE: 98.4 F | WEIGHT: 285 LBS

## 2025-03-01 DIAGNOSIS — J06.9 ACUTE UPPER RESPIRATORY INFECTION: Primary | ICD-10-CM

## 2025-03-01 DIAGNOSIS — R51.9 ACUTE NONINTRACTABLE HEADACHE, UNSPECIFIED HEADACHE TYPE: ICD-10-CM

## 2025-03-01 LAB
ANION GAP SERPL CALCULATED.3IONS-SCNC: 11 MMOL/L (ref 7–16)
BASOPHILS # BLD: 0.02 K/UL (ref 0–0.2)
BASOPHILS NFR BLD: 0 % (ref 0–2)
BUN SERPL-MCNC: 15 MG/DL (ref 6–20)
CALCIUM SERPL-MCNC: 9.3 MG/DL (ref 8.6–10.2)
CHLORIDE SERPL-SCNC: 100 MMOL/L (ref 98–107)
CO2 SERPL-SCNC: 25 MMOL/L (ref 22–29)
CREAT SERPL-MCNC: 1.3 MG/DL (ref 0.7–1.2)
EOSINOPHIL # BLD: 0.1 K/UL (ref 0.05–0.5)
EOSINOPHILS RELATIVE PERCENT: 1 % (ref 0–6)
ERYTHROCYTE [DISTWIDTH] IN BLOOD BY AUTOMATED COUNT: 12.8 % (ref 11.5–15)
GFR, ESTIMATED: 68 ML/MIN/1.73M2
GLUCOSE SERPL-MCNC: 122 MG/DL (ref 74–99)
HCT VFR BLD AUTO: 50.3 % (ref 37–54)
HGB BLD-MCNC: 17.1 G/DL (ref 12.5–16.5)
IMM GRANULOCYTES # BLD AUTO: 0.05 K/UL (ref 0–0.58)
IMM GRANULOCYTES NFR BLD: 1 % (ref 0–5)
INFLUENZA A BY PCR: NOT DETECTED
INFLUENZA B BY PCR: NOT DETECTED
LYMPHOCYTES NFR BLD: 0.64 K/UL (ref 1.5–4)
LYMPHOCYTES RELATIVE PERCENT: 8 % (ref 20–42)
MCH RBC QN AUTO: 30.7 PG (ref 26–35)
MCHC RBC AUTO-ENTMCNC: 34 G/DL (ref 32–34.5)
MCV RBC AUTO: 90.3 FL (ref 80–99.9)
MONOCYTES NFR BLD: 0.53 K/UL (ref 0.1–0.95)
MONOCYTES NFR BLD: 6 % (ref 2–12)
NEUTROPHILS NFR BLD: 84 % (ref 43–80)
NEUTS SEG NFR BLD: 6.98 K/UL (ref 1.8–7.3)
PLATELET # BLD AUTO: 201 K/UL (ref 130–450)
PMV BLD AUTO: 8.8 FL (ref 7–12)
POTASSIUM SERPL-SCNC: 3.9 MMOL/L (ref 3.5–5)
RBC # BLD AUTO: 5.57 M/UL (ref 3.8–5.8)
SARS-COV-2 RDRP RESP QL NAA+PROBE: NOT DETECTED
SODIUM SERPL-SCNC: 136 MMOL/L (ref 132–146)
SPECIMEN DESCRIPTION: NORMAL
WBC OTHER # BLD: 8.3 K/UL (ref 4.5–11.5)

## 2025-03-01 PROCEDURE — 87635 SARS-COV-2 COVID-19 AMP PRB: CPT

## 2025-03-01 PROCEDURE — 2580000003 HC RX 258: Performed by: STUDENT IN AN ORGANIZED HEALTH CARE EDUCATION/TRAINING PROGRAM

## 2025-03-01 PROCEDURE — 6360000002 HC RX W HCPCS: Performed by: STUDENT IN AN ORGANIZED HEALTH CARE EDUCATION/TRAINING PROGRAM

## 2025-03-01 PROCEDURE — 96375 TX/PRO/DX INJ NEW DRUG ADDON: CPT

## 2025-03-01 PROCEDURE — 87502 INFLUENZA DNA AMP PROBE: CPT

## 2025-03-01 PROCEDURE — 96374 THER/PROPH/DIAG INJ IV PUSH: CPT

## 2025-03-01 PROCEDURE — 71045 X-RAY EXAM CHEST 1 VIEW: CPT

## 2025-03-01 PROCEDURE — 80048 BASIC METABOLIC PNL TOTAL CA: CPT

## 2025-03-01 PROCEDURE — 85025 COMPLETE CBC W/AUTO DIFF WBC: CPT

## 2025-03-01 PROCEDURE — 96361 HYDRATE IV INFUSION ADD-ON: CPT

## 2025-03-01 PROCEDURE — 99284 EMERGENCY DEPT VISIT MOD MDM: CPT

## 2025-03-01 RX ORDER — 0.9 % SODIUM CHLORIDE 0.9 %
1000 INTRAVENOUS SOLUTION INTRAVENOUS ONCE
Status: COMPLETED | OUTPATIENT
Start: 2025-03-01 | End: 2025-03-01

## 2025-03-01 RX ORDER — KETOROLAC TROMETHAMINE 15 MG/ML
15 INJECTION, SOLUTION INTRAMUSCULAR; INTRAVENOUS ONCE
Status: DISCONTINUED | OUTPATIENT
Start: 2025-03-01 | End: 2025-03-01 | Stop reason: HOSPADM

## 2025-03-01 RX ORDER — DIPHENHYDRAMINE HYDROCHLORIDE 50 MG/ML
25 INJECTION INTRAMUSCULAR; INTRAVENOUS ONCE
Status: COMPLETED | OUTPATIENT
Start: 2025-03-01 | End: 2025-03-01

## 2025-03-01 RX ORDER — METOCLOPRAMIDE HYDROCHLORIDE 5 MG/ML
10 INJECTION INTRAMUSCULAR; INTRAVENOUS ONCE
Status: COMPLETED | OUTPATIENT
Start: 2025-03-01 | End: 2025-03-01

## 2025-03-01 RX ADMIN — SODIUM CHLORIDE 1000 ML: 9 INJECTION, SOLUTION INTRAVENOUS at 05:45

## 2025-03-01 RX ADMIN — METOCLOPRAMIDE 10 MG: 5 INJECTION, SOLUTION INTRAMUSCULAR; INTRAVENOUS at 05:56

## 2025-03-01 RX ADMIN — DIPHENHYDRAMINE HYDROCHLORIDE 25 MG: 50 INJECTION INTRAMUSCULAR; INTRAVENOUS at 05:56

## 2025-03-01 ASSESSMENT — PAIN DESCRIPTION - PAIN TYPE: TYPE: ACUTE PAIN

## 2025-03-01 ASSESSMENT — ENCOUNTER SYMPTOMS
VOMITING: 0
COUGH: 1
WHEEZING: 0
DIARRHEA: 0
EYE DISCHARGE: 0
SORE THROAT: 1
BACK PAIN: 0
SHORTNESS OF BREATH: 0
EYE REDNESS: 0
SINUS PRESSURE: 0
ABDOMINAL PAIN: 0
EYE PAIN: 0
NAUSEA: 0

## 2025-03-01 ASSESSMENT — PAIN SCALES - GENERAL: PAINLEVEL_OUTOF10: 6

## 2025-03-01 ASSESSMENT — PAIN - FUNCTIONAL ASSESSMENT: PAIN_FUNCTIONAL_ASSESSMENT: 0-10

## 2025-03-01 ASSESSMENT — PAIN DESCRIPTION - DESCRIPTORS: DESCRIPTORS: ACHING

## 2025-03-01 ASSESSMENT — PAIN DESCRIPTION - LOCATION: LOCATION: HEAD

## 2025-03-01 NOTE — ED PROVIDER NOTES
Department of Emergency Medicine   ED  Provider Note  Admit Date/RoomTime: 3/1/2025  4:12 AM  ED Room: 26/26              hospitals     Canelo Baker is a 50 y.o. male with a PMHx significant for  brain tumor, dvt on anticoagulation, HTN. HLD  who presents for evaluation of headache, cough congestion, beginning prior to arrival. The complaint has been persistent, moderate in severity, and worsened by nothing.   The patient states that he has been feeling really weak and lightheaded; around 0300 this am he took his temperature at home and it was 104F. He took some tylenol for this.   Patient started to have a scratchy throat yesterday around 1300. He then started to develop a cough, that is dry in nature with some spit. Notes a dull headache associated with this, denies any numbness, tingling, blurry vision, double vision. Rates the headache as a 0 out of 10.      Review of Systems   Constitutional:  Positive for fatigue and fever. Negative for chills.   HENT:  Positive for congestion and sore throat. Negative for ear pain and sinus pressure.    Eyes:  Negative for pain, discharge and redness.   Respiratory:  Positive for cough. Negative for shortness of breath and wheezing.    Cardiovascular:  Negative for chest pain.   Gastrointestinal:  Negative for abdominal pain, diarrhea, nausea and vomiting.   Genitourinary:  Negative for dysuria and frequency.   Musculoskeletal:  Negative for arthralgias and back pain.   Skin:  Negative for rash and wound.   Neurological:  Negative for weakness and headaches.   Hematological:  Negative for adenopathy.   All other systems reviewed and are negative.       Physical Exam     Procedures    MDM             Differential diagnosis: ***  Review of ED/ Outpatient Records: ***  Historians that case was discussed with: None ***  My EKG interpretation: See ED course  Imaging Non-plain film images such as CT, Ultrasound and MRI are read by the radiologist. Plain radiographic images are        Radiology:  XR CHEST PORTABLE   Final Result   No acute cardiopulmonary disease.             ------------------------- NURSING NOTES AND VITALS REVIEWED ---------------------------  Date / Time Roomed:  3/1/2025  4:12 AM  ED Bed Assignment:  20/20    The nursing notes within the ED encounter and vital signs as below have been reviewed.   /79   Pulse 84   Temp 98.4 °F (36.9 °C) (Oral)   Resp 16   Ht 1.93 m (6' 4\")   Wt 129.3 kg (285 lb)   SpO2 95%   BMI 34.69 kg/m²   Oxygen Saturation Interpretation: Normal      ------------------------------------------ PROGRESS NOTES ------------------------------------------  5:26 PM EST  I have spoken with the patient and discussed today’s results, in addition to providing specific details for the plan of care and counseling regarding the diagnosis and prognosis.  Their questions are answered at this time and they are agreeable with the plan. I discussed at length with them reasons for immediate return here for re evaluation. They will followup with their primary care physician by calling their office tomorrow.      --------------------------------- ADDITIONAL PROVIDER NOTES ---------------------------------  At this time the patient is without objective evidence of an acute process requiring hospitalization or inpatient management.  They have remained hemodynamically stable throughout their entire ED visit and are stable for discharge with outpatient follow-up.     The plan has been discussed in detail and they are aware of the specific conditions for emergent return, as well as the importance of follow-up.      Discharge Medication List as of 3/1/2025  7:54 AM          Diagnosis:  1. Acute upper respiratory infection    2. Acute nonintractable headache, unspecified headache type        Disposition:  Patient's disposition: Discharge to home  Patient's condition is stable.         Dylan Bradley DO  03/06/25 1726

## 2025-03-01 NOTE — ED NOTES
Department of Emergency Medicine  FIRST PROVIDER TRIAGE NOTE             Independent MLP           3/1/25  3:23 AM EST    Date of Encounter: 3/1/25   MRN: [id number]      HPI: Canelo Baker is a 50 y.o. male who presents to the ED for Fever and Headache (Denies n/v diarrhea)       ROS: Negative for cp, abd pain, back pain, or vomiting.    PE: Gen Appearance/Constitutional: alert  HEENT: Airway patent.    Initial Plan of Care: All treatment areas with department are currently occupied.      Plan to order/Initiate the following while awaiting opening in ED: Triage evaluation  .     Provider-Patient relationship only established for Provider In Triage (PIT).  Full assessment, HPI and examination not performed, therefore, it is not yet possible to state whether or not an emergency medical condition exists     Initial Plan of Care: Initiate Treatment-Testing, Proceed toTreatment Area When Bed Available for ED Attending/MLP to Continue Care  Secondary to high volume, low staffing, and/or boarding- patient to await bed availability.     This ends my PIT-Patient relationship.  Care of patient relinquished after triage         Electronically signed by EVON Bell - YUAN   DD: 3/1/25

## 2025-03-01 NOTE — DISCHARGE INSTRUCTIONS
XR CHEST PORTABLE   Final Result   No acute cardiopulmonary disease.           Follow-up with your family physician.  If symptoms worsen or concern arises please return for reevaluation.

## 2025-05-06 ENCOUNTER — HOSPITAL ENCOUNTER (EMERGENCY)
Age: 50
Discharge: HOME | End: 2025-05-06
Attending: EMERGENCY MEDICINE
Payer: MEDICARE

## 2025-05-06 ENCOUNTER — APPOINTMENT (OUTPATIENT)
Dept: CT IMAGING | Age: 50
End: 2025-05-06
Payer: MEDICARE

## 2025-05-06 VITALS
DIASTOLIC BLOOD PRESSURE: 90 MMHG | TEMPERATURE: 100.1 F | RESPIRATION RATE: 29 BRPM | HEART RATE: 96 BPM | WEIGHT: 280 LBS | HEIGHT: 76 IN | BODY MASS INDEX: 34.1 KG/M2 | OXYGEN SATURATION: 94 % | SYSTOLIC BLOOD PRESSURE: 150 MMHG

## 2025-05-06 DIAGNOSIS — R51.9 ACUTE NONINTRACTABLE HEADACHE, UNSPECIFIED HEADACHE TYPE: Primary | ICD-10-CM

## 2025-05-06 DIAGNOSIS — Z87.898 HISTORY OF BRAIN TUMOR: ICD-10-CM

## 2025-05-06 LAB
ALBUMIN SERPL-MCNC: 4.2 G/DL (ref 3.5–5.2)
ALP SERPL-CCNC: 63 U/L (ref 40–129)
ALT SERPL-CCNC: 29 U/L (ref 0–40)
ANION GAP SERPL CALCULATED.3IONS-SCNC: 12 MMOL/L (ref 7–16)
AST SERPL-CCNC: 26 U/L (ref 0–39)
BASOPHILS # BLD: 0.01 K/UL (ref 0–0.2)
BASOPHILS NFR BLD: 0 % (ref 0–2)
BILIRUB SERPL-MCNC: 0.4 MG/DL (ref 0–1.2)
BUN SERPL-MCNC: 13 MG/DL (ref 6–20)
CALCIUM SERPL-MCNC: 9.3 MG/DL (ref 8.6–10.2)
CHLORIDE SERPL-SCNC: 99 MMOL/L (ref 98–107)
CO2 SERPL-SCNC: 27 MMOL/L (ref 22–29)
CREAT SERPL-MCNC: 1.6 MG/DL (ref 0.7–1.2)
EOSINOPHIL # BLD: 0.04 K/UL (ref 0.05–0.5)
EOSINOPHILS RELATIVE PERCENT: 1 % (ref 0–6)
ERYTHROCYTE [DISTWIDTH] IN BLOOD BY AUTOMATED COUNT: 12.8 % (ref 11.5–15)
GFR, ESTIMATED: 52 ML/MIN/1.73M2
GLUCOSE SERPL-MCNC: 129 MG/DL (ref 74–99)
HCT VFR BLD AUTO: 45.3 % (ref 37–54)
HGB BLD-MCNC: 15 G/DL (ref 12.5–16.5)
IMM GRANULOCYTES # BLD AUTO: <0.03 K/UL (ref 0–0.58)
IMM GRANULOCYTES NFR BLD: 0 % (ref 0–5)
LYMPHOCYTES NFR BLD: 1.24 K/UL (ref 1.5–4)
LYMPHOCYTES RELATIVE PERCENT: 25 % (ref 20–42)
MCH RBC QN AUTO: 30.1 PG (ref 26–35)
MCHC RBC AUTO-ENTMCNC: 33.1 G/DL (ref 32–34.5)
MCV RBC AUTO: 90.8 FL (ref 80–99.9)
MONOCYTES NFR BLD: 0.54 K/UL (ref 0.1–0.95)
MONOCYTES NFR BLD: 11 % (ref 2–12)
NEUTROPHILS NFR BLD: 63 % (ref 43–80)
NEUTS SEG NFR BLD: 3.12 K/UL (ref 1.8–7.3)
PLATELET # BLD AUTO: 162 K/UL (ref 130–450)
PMV BLD AUTO: 8.7 FL (ref 7–12)
POTASSIUM SERPL-SCNC: 4.2 MMOL/L (ref 3.5–5)
PROT SERPL-MCNC: 7 G/DL (ref 6.4–8.3)
RBC # BLD AUTO: 4.99 M/UL (ref 3.8–5.8)
SODIUM SERPL-SCNC: 138 MMOL/L (ref 132–146)
TROPONIN I SERPL HS-MCNC: 12 NG/L (ref 0–22)
WBC OTHER # BLD: 5 K/UL (ref 4.5–11.5)

## 2025-05-06 PROCEDURE — 80053 COMPREHEN METABOLIC PANEL: CPT

## 2025-05-06 PROCEDURE — 96374 THER/PROPH/DIAG INJ IV PUSH: CPT

## 2025-05-06 PROCEDURE — 85025 COMPLETE CBC W/AUTO DIFF WBC: CPT

## 2025-05-06 PROCEDURE — 93005 ELECTROCARDIOGRAM TRACING: CPT | Performed by: EMERGENCY MEDICINE

## 2025-05-06 PROCEDURE — 6360000004 HC RX CONTRAST MEDICATION: Performed by: RADIOLOGY

## 2025-05-06 PROCEDURE — 70460 CT HEAD/BRAIN W/DYE: CPT

## 2025-05-06 PROCEDURE — 6360000002 HC RX W HCPCS: Performed by: EMERGENCY MEDICINE

## 2025-05-06 PROCEDURE — 70450 CT HEAD/BRAIN W/O DYE: CPT

## 2025-05-06 PROCEDURE — 96375 TX/PRO/DX INJ NEW DRUG ADDON: CPT

## 2025-05-06 PROCEDURE — 99285 EMERGENCY DEPT VISIT HI MDM: CPT

## 2025-05-06 PROCEDURE — 71275 CT ANGIOGRAPHY CHEST: CPT

## 2025-05-06 PROCEDURE — 84484 ASSAY OF TROPONIN QUANT: CPT

## 2025-05-06 RX ORDER — IOPAMIDOL 755 MG/ML
75 INJECTION, SOLUTION INTRAVASCULAR
Status: COMPLETED | OUTPATIENT
Start: 2025-05-06 | End: 2025-05-06

## 2025-05-06 RX ORDER — DEXAMETHASONE SODIUM PHOSPHATE 10 MG/ML
10 INJECTION, SOLUTION INTRA-ARTICULAR; INTRALESIONAL; INTRAMUSCULAR; INTRAVENOUS; SOFT TISSUE ONCE
Status: COMPLETED | OUTPATIENT
Start: 2025-05-06 | End: 2025-05-06

## 2025-05-06 RX ORDER — DIPHENHYDRAMINE HYDROCHLORIDE 50 MG/ML
25 INJECTION, SOLUTION INTRAMUSCULAR; INTRAVENOUS ONCE
Status: COMPLETED | OUTPATIENT
Start: 2025-05-06 | End: 2025-05-06

## 2025-05-06 RX ORDER — METOCLOPRAMIDE HYDROCHLORIDE 5 MG/ML
10 INJECTION INTRAMUSCULAR; INTRAVENOUS ONCE
Status: COMPLETED | OUTPATIENT
Start: 2025-05-06 | End: 2025-05-06

## 2025-05-06 RX ADMIN — METOCLOPRAMIDE 10 MG: 5 INJECTION, SOLUTION INTRAMUSCULAR; INTRAVENOUS at 18:17

## 2025-05-06 RX ADMIN — DIPHENHYDRAMINE HYDROCHLORIDE 25 MG: 50 INJECTION INTRAMUSCULAR; INTRAVENOUS at 18:16

## 2025-05-06 RX ADMIN — DEXAMETHASONE SODIUM PHOSPHATE 10 MG: 10 INJECTION INTRAMUSCULAR; INTRAVENOUS at 18:17

## 2025-05-06 RX ADMIN — IOPAMIDOL 75 ML: 755 INJECTION, SOLUTION INTRAVENOUS at 20:17

## 2025-05-06 ASSESSMENT — PAIN DESCRIPTION - ONSET: ONSET: ON-GOING

## 2025-05-06 ASSESSMENT — PAIN - FUNCTIONAL ASSESSMENT: PAIN_FUNCTIONAL_ASSESSMENT: PREVENTS OR INTERFERES SOME ACTIVE ACTIVITIES AND ADLS

## 2025-05-06 ASSESSMENT — PAIN SCALES - GENERAL: PAINLEVEL_OUTOF10: 10

## 2025-05-06 ASSESSMENT — PAIN DESCRIPTION - FREQUENCY: FREQUENCY: CONTINUOUS

## 2025-05-06 ASSESSMENT — PAIN DESCRIPTION - LOCATION: LOCATION: HEAD

## 2025-05-06 ASSESSMENT — PAIN DESCRIPTION - ORIENTATION: ORIENTATION: POSTERIOR

## 2025-05-06 ASSESSMENT — PAIN DESCRIPTION - PAIN TYPE: TYPE: ACUTE PAIN

## 2025-05-06 NOTE — ED PROVIDER NOTES
Patient was given the following medications:  Medications   metoclopramide (REGLAN) injection 10 mg (10 mg IntraVENous Given 5/6/25 1817)   diphenhydrAMINE (BENADRYL) injection 25 mg (25 mg IntraVENous Given 5/6/25 1816)   dexAMETHasone (DECADRON) injection 10 mg (10 mg IntraVENous Given 5/6/25 1817)   iopamidol (ISOVUE-370) 76 % injection 75 mL (75 mLs IntraVENous Given 5/6/25 2017)             [unfilled]    Chronic Conditions:   Active Ambulatory Problems     Diagnosis Date Noted    Cryptogenic stroke (HCC) 02/18/2016    Presence of cardiac device 04/29/2016    Leg swelling 09/23/2021    S/P craniotomy 09/23/2021    KACY (acute kidney injury) 11/16/2021    History of DVT (deep vein thrombosis) 11/16/2021    Astrocytoma (HCC) 11/16/2021    Polyuria 11/16/2021    Polydipsia 11/16/2021    Diabetes insipidus 11/16/2021     Resolved Ambulatory Problems     Diagnosis Date Noted    Low back pain 05/29/2015    Acute deep vein thrombosis (DVT) of femoral vein of left lower extremity (HCC) 09/23/2021     Past Medical History:   Diagnosis Date    Brain tumor (benign) (HCC)     DVT of leg (deep venous thrombosis) (HCC)     Hyperlipidemia     Hypertension          CONSULTS: (Who and What was discussed)  None    Discussion with Other Profesionals : None    Social Determinants : None    Records Reviewed : Outpatient Notes neurosurgery at [facility]    CC/HPI Summary, DDx, ED Course, and Reassessment:   Patient presents for acute bilateral headache.  He has history of brain mass excision and chemotherapy.  Differential diagnosis includes brain cancer recurrence with vasogenic edema versus intracranial hemorrhage versus cluster headache versus migraine headache to name a few.  Patient had CT scans with and without contrast.  He has stable postoperative findings there is no evidence of any acute intracranial abnormality no ICH no vasogenic edema from mass lesion at this time.  Patient had symptom resolved with migraine cocktail.

## 2025-05-07 LAB
EKG ATRIAL RATE: 96 BPM
EKG P AXIS: 25 DEGREES
EKG P-R INTERVAL: 144 MS
EKG Q-T INTERVAL: 326 MS
EKG QRS DURATION: 94 MS
EKG QTC CALCULATION (BAZETT): 411 MS
EKG R AXIS: -14 DEGREES
EKG T AXIS: 9 DEGREES
EKG VENTRICULAR RATE: 96 BPM

## 2025-05-07 PROCEDURE — 93010 ELECTROCARDIOGRAM REPORT: CPT | Performed by: INTERNAL MEDICINE

## 2025-05-07 NOTE — DISCHARGE INSTRUCTIONS
unremarkable.     Soft Tissues/Bones: No acute bone or soft tissue abnormality.  Degenerative  changes thoracic spine I     IMPRESSION:     No evidence of pulmonary embolism or acute pulmonary abnormality.